# Patient Record
Sex: MALE | Race: WHITE | NOT HISPANIC OR LATINO | Employment: STUDENT | URBAN - METROPOLITAN AREA
[De-identification: names, ages, dates, MRNs, and addresses within clinical notes are randomized per-mention and may not be internally consistent; named-entity substitution may affect disease eponyms.]

---

## 2021-03-26 ENCOUNTER — HOSPITAL ENCOUNTER (EMERGENCY)
Facility: OTHER | Age: 21
Discharge: HOME | End: 2021-03-26
Attending: EMERGENCY MEDICINE
Payer: COMMERCIAL

## 2021-03-26 VITALS
HEIGHT: 66 IN | TEMPERATURE: 98 F | DIASTOLIC BLOOD PRESSURE: 64 MMHG | SYSTOLIC BLOOD PRESSURE: 108 MMHG | OXYGEN SATURATION: 100 % | HEART RATE: 62 BPM | WEIGHT: 175 LBS | RESPIRATION RATE: 18 BRPM | BODY MASS INDEX: 28.12 KG/M2

## 2021-03-26 DIAGNOSIS — R19.7 DIARRHEA, UNSPECIFIED TYPE: Primary | ICD-10-CM

## 2021-03-26 DIAGNOSIS — R10.9 ABDOMINAL PAIN: ICD-10-CM

## 2021-03-26 LAB
ANION GAP SERPL CALC-SCNC: 10 MMOL/L (ref 8–16)
BASOPHILS # BLD AUTO: 0.02 K/UL (ref 0–0.2)
BASOPHILS NFR BLD: 0.4 % (ref 0–1.9)
BUN SERPL-MCNC: 13 MG/DL (ref 6–20)
CALCIUM SERPL-MCNC: 9.8 MG/DL (ref 8.7–10.5)
CHLORIDE SERPL-SCNC: 103 MMOL/L (ref 95–110)
CK SERPL-CCNC: 78 U/L (ref 20–200)
CO2 SERPL-SCNC: 26 MMOL/L (ref 23–29)
CREAT SERPL-MCNC: 1.1 MG/DL (ref 0.5–1.4)
CREAT SERPL-MCNC: 1.1 MG/DL (ref 0.5–1.4)
DIFFERENTIAL METHOD: ABNORMAL
EOSINOPHIL # BLD AUTO: 0.1 K/UL (ref 0–0.5)
EOSINOPHIL NFR BLD: 1.3 % (ref 0–8)
ERYTHROCYTE [DISTWIDTH] IN BLOOD BY AUTOMATED COUNT: 11.9 % (ref 11.5–14.5)
EST. GFR  (AFRICAN AMERICAN): >60 ML/MIN/1.73 M^2
EST. GFR  (NON AFRICAN AMERICAN): >60 ML/MIN/1.73 M^2
GLUCOSE SERPL-MCNC: 84 MG/DL (ref 70–110)
HCT VFR BLD AUTO: 41 % (ref 40–54)
HCV AB SERPL QL IA: NEGATIVE
HGB BLD-MCNC: 14.4 G/DL (ref 14–18)
HIV 1+2 AB+HIV1 P24 AG SERPL QL IA: NEGATIVE
IMM GRANULOCYTES # BLD AUTO: 0.01 K/UL (ref 0–0.04)
IMM GRANULOCYTES NFR BLD AUTO: 0.2 % (ref 0–0.5)
LYMPHOCYTES # BLD AUTO: 1.6 K/UL (ref 1–4.8)
LYMPHOCYTES NFR BLD: 29.5 % (ref 18–48)
MCH RBC QN AUTO: 30.4 PG (ref 27–31)
MCHC RBC AUTO-ENTMCNC: 35.1 G/DL (ref 32–36)
MCV RBC AUTO: 87 FL (ref 82–98)
MONOCYTES # BLD AUTO: 0.5 K/UL (ref 0.3–1)
MONOCYTES NFR BLD: 9.5 % (ref 4–15)
NEUTROPHILS # BLD AUTO: 3.1 K/UL (ref 1.8–7.7)
NEUTROPHILS NFR BLD: 59.1 % (ref 38–73)
NRBC BLD-RTO: 0 /100 WBC
PLATELET # BLD AUTO: 299 K/UL (ref 150–350)
PMV BLD AUTO: 9.1 FL (ref 9.2–12.9)
POTASSIUM SERPL-SCNC: 4.1 MMOL/L (ref 3.5–5.1)
RBC # BLD AUTO: 4.73 M/UL (ref 4.6–6.2)
SAMPLE: NORMAL
SODIUM SERPL-SCNC: 139 MMOL/L (ref 136–145)
WBC # BLD AUTO: 5.29 K/UL (ref 3.9–12.7)

## 2021-03-26 PROCEDURE — 82565 ASSAY OF CREATININE: CPT | Mod: 91

## 2021-03-26 PROCEDURE — 99900035 HC TECH TIME PER 15 MIN (STAT)

## 2021-03-26 PROCEDURE — 80048 BASIC METABOLIC PNL TOTAL CA: CPT | Performed by: EMERGENCY MEDICINE

## 2021-03-26 PROCEDURE — 82550 ASSAY OF CK (CPK): CPT | Performed by: EMERGENCY MEDICINE

## 2021-03-26 PROCEDURE — 96374 THER/PROPH/DIAG INJ IV PUSH: CPT

## 2021-03-26 PROCEDURE — 99285 EMERGENCY DEPT VISIT HI MDM: CPT | Mod: 25

## 2021-03-26 PROCEDURE — 85025 COMPLETE CBC W/AUTO DIFF WBC: CPT | Performed by: EMERGENCY MEDICINE

## 2021-03-26 PROCEDURE — 86803 HEPATITIS C AB TEST: CPT | Performed by: EMERGENCY MEDICINE

## 2021-03-26 PROCEDURE — 25500020 PHARM REV CODE 255: Performed by: EMERGENCY MEDICINE

## 2021-03-26 PROCEDURE — 25000003 PHARM REV CODE 250: Performed by: EMERGENCY MEDICINE

## 2021-03-26 PROCEDURE — 86703 HIV-1/HIV-2 1 RESULT ANTBDY: CPT | Performed by: EMERGENCY MEDICINE

## 2021-03-26 PROCEDURE — 63600175 PHARM REV CODE 636 W HCPCS: Performed by: EMERGENCY MEDICINE

## 2021-03-26 PROCEDURE — 96361 HYDRATE IV INFUSION ADD-ON: CPT

## 2021-03-26 RX ORDER — DICYCLOMINE HYDROCHLORIDE 20 MG/1
20 TABLET ORAL 2 TIMES DAILY
Qty: 20 TABLET | Refills: 0 | Status: SHIPPED | OUTPATIENT
Start: 2021-03-26 | End: 2021-04-25

## 2021-03-26 RX ORDER — ONDANSETRON 4 MG/1
4 TABLET, ORALLY DISINTEGRATING ORAL EVERY 8 HOURS PRN
Qty: 20 TABLET | Refills: 0 | Status: SHIPPED | OUTPATIENT
Start: 2021-03-26 | End: 2021-04-02

## 2021-03-26 RX ORDER — KETOROLAC TROMETHAMINE 30 MG/ML
15 INJECTION, SOLUTION INTRAMUSCULAR; INTRAVENOUS
Status: COMPLETED | OUTPATIENT
Start: 2021-03-26 | End: 2021-03-26

## 2021-03-26 RX ORDER — IBUPROFEN 600 MG/1
600 TABLET ORAL EVERY 6 HOURS PRN
Qty: 20 TABLET | Refills: 0 | Status: SHIPPED | OUTPATIENT
Start: 2021-03-26

## 2021-03-26 RX ADMIN — KETOROLAC TROMETHAMINE 15 MG: 30 INJECTION, SOLUTION INTRAMUSCULAR; INTRAVENOUS at 03:03

## 2021-03-26 RX ADMIN — IOHEXOL 75 ML: 350 INJECTION, SOLUTION INTRAVENOUS at 04:03

## 2021-03-26 RX ADMIN — SODIUM CHLORIDE 1000 ML: 0.9 INJECTION, SOLUTION INTRAVENOUS at 03:03

## 2021-04-30 ENCOUNTER — HOSPITAL ENCOUNTER (INPATIENT)
Facility: OTHER | Age: 21
LOS: 7 days | Discharge: HOME | DRG: 871 | End: 2021-05-07
Attending: EMERGENCY MEDICINE | Admitting: INTERNAL MEDICINE
Payer: COMMERCIAL

## 2021-04-30 DIAGNOSIS — I40.9 ACUTE MYOCARDITIS, UNSPECIFIED MYOCARDITIS TYPE: ICD-10-CM

## 2021-04-30 DIAGNOSIS — J45.21 MILD INTERMITTENT ASTHMA WITH ACUTE EXACERBATION: ICD-10-CM

## 2021-04-30 DIAGNOSIS — I31.9 PERICARDITIS, UNSPECIFIED CHRONICITY, UNSPECIFIED TYPE: ICD-10-CM

## 2021-04-30 DIAGNOSIS — T50.Z95A VACCINATION SIDE EFFECTS, INITIAL ENCOUNTER: Primary | ICD-10-CM

## 2021-04-30 DIAGNOSIS — R00.0 TACHYCARDIA: ICD-10-CM

## 2021-04-30 DIAGNOSIS — R07.9 CHEST PAIN: ICD-10-CM

## 2021-04-30 DIAGNOSIS — I31.9 MYOPERICARDITIS: ICD-10-CM

## 2021-04-30 DIAGNOSIS — I51.4 MYOCARDITIS: ICD-10-CM

## 2021-04-30 DIAGNOSIS — I45.9 DROPPED HEART BEATS: ICD-10-CM

## 2021-04-30 DIAGNOSIS — I50.21 ACUTE SYSTOLIC HEART FAILURE: ICD-10-CM

## 2021-04-30 DIAGNOSIS — I42.9 CARDIOMYOPATHY: ICD-10-CM

## 2021-04-30 DIAGNOSIS — M35.9 UNDIFFERENTIATED CONNECTIVE TISSUE DISEASE: ICD-10-CM

## 2021-04-30 PROBLEM — R65.10 SIRS (SYSTEMIC INFLAMMATORY RESPONSE SYNDROME): Status: ACTIVE | Noted: 2021-04-30

## 2021-04-30 PROBLEM — R79.89 ELEVATED TROPONIN: Status: ACTIVE | Noted: 2021-04-30

## 2021-04-30 PROBLEM — K58.9 IBS (IRRITABLE BOWEL SYNDROME): Status: ACTIVE | Noted: 2021-04-30

## 2021-04-30 LAB
ANION GAP SERPL CALC-SCNC: 10 MMOL/L (ref 8–16)
AV INDEX (PROSTH): 0.7
AV MEAN GRADIENT: 1 MMHG
AV PEAK GRADIENT: 2 MMHG
AV VALVE AREA: 3.04 CM2
AV VELOCITY RATIO: 0.84
BASOPHILS # BLD AUTO: 0.02 K/UL (ref 0–0.2)
BASOPHILS NFR BLD: 0.2 % (ref 0–1.9)
BNP SERPL-MCNC: 36 PG/ML (ref 0–99)
BSA FOR ECHO PROCEDURE: 1.92 M2
BUN SERPL-MCNC: 10 MG/DL (ref 6–20)
CALCIUM SERPL-MCNC: 7.6 MG/DL (ref 8.7–10.5)
CHLORIDE SERPL-SCNC: 105 MMOL/L (ref 95–110)
CO2 SERPL-SCNC: 20 MMOL/L (ref 23–29)
CREAT SERPL-MCNC: 1.1 MG/DL (ref 0.5–1.4)
CRP SERPL-MCNC: 85.6 MG/L (ref 0–8.2)
CTP QC/QA: YES
CV ECHO LV RWT: 0.32 CM
DIFFERENTIAL METHOD: ABNORMAL
DOP CALC AO PEAK VEL: 0.69 M/S
DOP CALC AO VTI: 11.78 CM
DOP CALC LVOT AREA: 4.4 CM2
DOP CALC LVOT DIAMETER: 2.36 CM
DOP CALC LVOT PEAK VEL: 0.58 M/S
DOP CALC LVOT STROKE VOLUME: 35.85 CM3
DOP CALCLVOT PEAK VEL VTI: 8.2 CM
E WAVE DECELERATION TIME: 134.84 MSEC
E/A RATIO: 1.32
E/E' RATIO: 9.54 M/S
ECHO LV POSTERIOR WALL: 0.79 CM (ref 0.6–1.1)
EJECTION FRACTION: 20 %
EOSINOPHIL # BLD AUTO: 0 K/UL (ref 0–0.5)
EOSINOPHIL NFR BLD: 0 % (ref 0–8)
ERYTHROCYTE [DISTWIDTH] IN BLOOD BY AUTOMATED COUNT: 12.1 % (ref 11.5–14.5)
EST. GFR  (AFRICAN AMERICAN): >60 ML/MIN/1.73 M^2
EST. GFR  (NON AFRICAN AMERICAN): >60 ML/MIN/1.73 M^2
FRACTIONAL SHORTENING: 10 % (ref 28–44)
GLUCOSE SERPL-MCNC: 125 MG/DL (ref 70–110)
HCT VFR BLD AUTO: 38.7 % (ref 40–54)
HGB BLD-MCNC: 13.3 G/DL (ref 14–18)
IMM GRANULOCYTES # BLD AUTO: 0.05 K/UL (ref 0–0.04)
IMM GRANULOCYTES NFR BLD AUTO: 0.4 % (ref 0–0.5)
INTERVENTRICULAR SEPTUM: 0.8 CM (ref 0.6–1.1)
IVRT: 59.98 MSEC
LA MAJOR: 4.03 CM
LA MINOR: 5.23 CM
LA WIDTH: 3.19 CM
LEFT ATRIUM SIZE: 2.99 CM
LEFT ATRIUM VOLUME INDEX MOD: 21.7 ML/M2
LEFT ATRIUM VOLUME INDEX: 19.5 ML/M2
LEFT ATRIUM VOLUME MOD: 41 CM3
LEFT ATRIUM VOLUME: 36.91 CM3
LEFT INTERNAL DIMENSION IN SYSTOLE: 4.45 CM (ref 2.1–4)
LEFT VENTRICLE DIASTOLIC VOLUME INDEX: 61.09 ML/M2
LEFT VENTRICLE DIASTOLIC VOLUME: 115.46 ML
LEFT VENTRICLE MASS INDEX: 70 G/M2
LEFT VENTRICLE SYSTOLIC VOLUME INDEX: 47.7 ML/M2
LEFT VENTRICLE SYSTOLIC VOLUME: 90.15 ML
LEFT VENTRICULAR INTERNAL DIMENSION IN DIASTOLE: 4.95 CM (ref 3.5–6)
LEFT VENTRICULAR MASS: 132.42 G
LV LATERAL E/E' RATIO: 6.89 M/S
LV SEPTAL E/E' RATIO: 15.5 M/S
LYMPHOCYTES # BLD AUTO: 1.4 K/UL (ref 1–4.8)
LYMPHOCYTES NFR BLD: 12.5 % (ref 18–48)
MCH RBC QN AUTO: 29.8 PG (ref 27–31)
MCHC RBC AUTO-ENTMCNC: 34.4 G/DL (ref 32–36)
MCV RBC AUTO: 87 FL (ref 82–98)
MONOCYTES # BLD AUTO: 0.6 K/UL (ref 0.3–1)
MONOCYTES NFR BLD: 4.8 % (ref 4–15)
MV PEAK A VEL: 0.47 M/S
MV PEAK E VEL: 0.62 M/S
MV STENOSIS PRESSURE HALF TIME: 39.1 MS
MV VALVE AREA P 1/2 METHOD: 5.63 CM2
NEUTROPHILS # BLD AUTO: 9.4 K/UL (ref 1.8–7.7)
NEUTROPHILS NFR BLD: 82.1 % (ref 38–73)
NRBC BLD-RTO: 0 /100 WBC
PISA TR MAX VEL: 2.07 M/S
PLATELET # BLD AUTO: 243 K/UL (ref 150–450)
PMV BLD AUTO: 9.3 FL (ref 9.2–12.9)
POTASSIUM SERPL-SCNC: 4.3 MMOL/L (ref 3.5–5.1)
PROCALCITONIN SERPL IA-MCNC: 0.92 NG/ML
PULM VEIN S/D RATIO: 1.11
PV PEAK D VEL: 0.18 M/S
PV PEAK S VEL: 0.2 M/S
PV PEAK VELOCITY: 0.49 CM/S
RA MAJOR: 3.04 CM
RA PRESSURE: 3 MMHG
RBC # BLD AUTO: 4.46 M/UL (ref 4.6–6.2)
SARS-COV-2 RDRP RESP QL NAA+PROBE: NEGATIVE
SINUS: 2.28 CM
SODIUM SERPL-SCNC: 135 MMOL/L (ref 136–145)
TDI LATERAL: 0.09 M/S
TDI SEPTAL: 0.04 M/S
TDI: 0.07 M/S
TR MAX PG: 17 MMHG
TROPONIN I SERPL DL<=0.01 NG/ML-MCNC: 0.39 NG/ML (ref 0–0.03)
TROPONIN I SERPL DL<=0.01 NG/ML-MCNC: 0.72 NG/ML (ref 0–0.03)
TROPONIN I SERPL DL<=0.01 NG/ML-MCNC: 0.85 NG/ML (ref 0–0.03)
TSH SERPL DL<=0.005 MIU/L-ACNC: 0.56 UIU/ML (ref 0.4–4)
TV REST PULMONARY ARTERY PRESSURE: 20 MMHG
WBC # BLD AUTO: 11.47 K/UL (ref 3.9–12.7)

## 2021-04-30 PROCEDURE — 36415 COLL VENOUS BLD VENIPUNCTURE: CPT | Performed by: INTERNAL MEDICINE

## 2021-04-30 PROCEDURE — 93005 ELECTROCARDIOGRAM TRACING: CPT

## 2021-04-30 PROCEDURE — 84484 ASSAY OF TROPONIN QUANT: CPT | Mod: 91 | Performed by: INTERNAL MEDICINE

## 2021-04-30 PROCEDURE — 93010 EKG 12-LEAD: ICD-10-PCS | Mod: 77,,, | Performed by: INTERNAL MEDICINE

## 2021-04-30 PROCEDURE — 96374 THER/PROPH/DIAG INJ IV PUSH: CPT

## 2021-04-30 PROCEDURE — 25000003 PHARM REV CODE 250: Performed by: EMERGENCY MEDICINE

## 2021-04-30 PROCEDURE — 99291 CRITICAL CARE FIRST HOUR: CPT | Mod: 25

## 2021-04-30 PROCEDURE — U0002 COVID-19 LAB TEST NON-CDC: HCPCS | Performed by: EMERGENCY MEDICINE

## 2021-04-30 PROCEDURE — 63600175 PHARM REV CODE 636 W HCPCS: Performed by: INTERNAL MEDICINE

## 2021-04-30 PROCEDURE — 93010 ELECTROCARDIOGRAM REPORT: CPT | Mod: 77,,, | Performed by: INTERNAL MEDICINE

## 2021-04-30 PROCEDURE — 85651 RBC SED RATE NONAUTOMATED: CPT | Performed by: INTERNAL MEDICINE

## 2021-04-30 PROCEDURE — 84443 ASSAY THYROID STIM HORMONE: CPT | Performed by: INTERNAL MEDICINE

## 2021-04-30 PROCEDURE — 25000242 PHARM REV CODE 250 ALT 637 W/ HCPCS: Performed by: EMERGENCY MEDICINE

## 2021-04-30 PROCEDURE — 94640 AIRWAY INHALATION TREATMENT: CPT

## 2021-04-30 PROCEDURE — 99223 1ST HOSP IP/OBS HIGH 75: CPT | Mod: ,,, | Performed by: INTERNAL MEDICINE

## 2021-04-30 PROCEDURE — 63600175 PHARM REV CODE 636 W HCPCS: Performed by: EMERGENCY MEDICINE

## 2021-04-30 PROCEDURE — 93010 ELECTROCARDIOGRAM REPORT: CPT | Mod: ,,, | Performed by: INTERNAL MEDICINE

## 2021-04-30 PROCEDURE — 96361 HYDRATE IV INFUSION ADD-ON: CPT

## 2021-04-30 PROCEDURE — 84484 ASSAY OF TROPONIN QUANT: CPT | Mod: 91 | Performed by: EMERGENCY MEDICINE

## 2021-04-30 PROCEDURE — 21400001 HC TELEMETRY ROOM

## 2021-04-30 PROCEDURE — 96375 TX/PRO/DX INJ NEW DRUG ADDON: CPT

## 2021-04-30 PROCEDURE — 84145 PROCALCITONIN (PCT): CPT | Performed by: INTERNAL MEDICINE

## 2021-04-30 PROCEDURE — 99223 PR INITIAL HOSPITAL CARE,LEVL III: ICD-10-PCS | Mod: ,,, | Performed by: INTERNAL MEDICINE

## 2021-04-30 PROCEDURE — 99255 PR INITIAL INPATIENT CONSULT,LEVL V: ICD-10-PCS | Mod: 25,,, | Performed by: INTERNAL MEDICINE

## 2021-04-30 PROCEDURE — 85025 COMPLETE CBC W/AUTO DIFF WBC: CPT | Performed by: EMERGENCY MEDICINE

## 2021-04-30 PROCEDURE — 80048 BASIC METABOLIC PNL TOTAL CA: CPT | Performed by: EMERGENCY MEDICINE

## 2021-04-30 PROCEDURE — 99255 IP/OBS CONSLTJ NEW/EST HI 80: CPT | Mod: 25,,, | Performed by: INTERNAL MEDICINE

## 2021-04-30 PROCEDURE — 25000003 PHARM REV CODE 250: Performed by: INTERNAL MEDICINE

## 2021-04-30 PROCEDURE — 93010 EKG 12-LEAD: ICD-10-PCS | Mod: ,,, | Performed by: INTERNAL MEDICINE

## 2021-04-30 PROCEDURE — 86140 C-REACTIVE PROTEIN: CPT | Performed by: INTERNAL MEDICINE

## 2021-04-30 PROCEDURE — 84484 ASSAY OF TROPONIN QUANT: CPT | Performed by: INTERNAL MEDICINE

## 2021-04-30 PROCEDURE — 83880 ASSAY OF NATRIURETIC PEPTIDE: CPT | Performed by: INTERNAL MEDICINE

## 2021-04-30 RX ORDER — KETOROLAC TROMETHAMINE 30 MG/ML
15 INJECTION, SOLUTION INTRAMUSCULAR; INTRAVENOUS
Status: COMPLETED | OUTPATIENT
Start: 2021-04-30 | End: 2021-04-30

## 2021-04-30 RX ORDER — SERTRALINE HYDROCHLORIDE 50 MG/1
75 TABLET, FILM COATED ORAL DAILY
COMMUNITY
End: 2022-05-10 | Stop reason: SDUPTHER

## 2021-04-30 RX ORDER — PANTOPRAZOLE SODIUM 40 MG/1
40 TABLET, DELAYED RELEASE ORAL DAILY
Status: DISCONTINUED | OUTPATIENT
Start: 2021-04-30 | End: 2021-05-07 | Stop reason: HOSPADM

## 2021-04-30 RX ORDER — ALBUTEROL SULFATE 2.5 MG/.5ML
2.5 SOLUTION RESPIRATORY (INHALATION)
Status: COMPLETED | OUTPATIENT
Start: 2021-04-30 | End: 2021-04-30

## 2021-04-30 RX ORDER — ACETAMINOPHEN 500 MG
1000 TABLET ORAL
Status: COMPLETED | OUTPATIENT
Start: 2021-04-30 | End: 2021-04-30

## 2021-04-30 RX ORDER — IBUPROFEN 400 MG/1
800 TABLET ORAL
Status: COMPLETED | OUTPATIENT
Start: 2021-04-30 | End: 2021-04-30

## 2021-04-30 RX ORDER — IBUPROFEN 600 MG/1
600 TABLET ORAL 3 TIMES DAILY
Status: DISCONTINUED | OUTPATIENT
Start: 2021-04-30 | End: 2021-05-01

## 2021-04-30 RX ORDER — COLCHICINE 0.6 MG/1
0.6 TABLET, FILM COATED ORAL 2 TIMES DAILY
Status: DISCONTINUED | OUTPATIENT
Start: 2021-04-30 | End: 2021-05-07 | Stop reason: HOSPADM

## 2021-04-30 RX ORDER — ONDANSETRON 2 MG/ML
4 INJECTION INTRAMUSCULAR; INTRAVENOUS
Status: DISCONTINUED | OUTPATIENT
Start: 2021-04-30 | End: 2021-04-30

## 2021-04-30 RX ORDER — SODIUM CHLORIDE 0.9 % (FLUSH) 0.9 %
10 SYRINGE (ML) INJECTION
Status: DISCONTINUED | OUTPATIENT
Start: 2021-04-30 | End: 2021-05-07 | Stop reason: HOSPADM

## 2021-04-30 RX ORDER — ONDANSETRON 2 MG/ML
4 INJECTION INTRAMUSCULAR; INTRAVENOUS EVERY 6 HOURS PRN
Status: DISCONTINUED | OUTPATIENT
Start: 2021-04-30 | End: 2021-05-07 | Stop reason: HOSPADM

## 2021-04-30 RX ORDER — PANTOPRAZOLE SODIUM 40 MG/1
40 TABLET, DELAYED RELEASE ORAL DAILY
COMMUNITY

## 2021-04-30 RX ORDER — MORPHINE SULFATE 4 MG/ML
4 INJECTION, SOLUTION INTRAMUSCULAR; INTRAVENOUS
Status: COMPLETED | OUTPATIENT
Start: 2021-04-30 | End: 2021-04-30

## 2021-04-30 RX ORDER — IBUPROFEN 200 MG
24 TABLET ORAL
Status: DISCONTINUED | OUTPATIENT
Start: 2021-04-30 | End: 2021-05-07 | Stop reason: HOSPADM

## 2021-04-30 RX ORDER — AMITRIPTYLINE HYDROCHLORIDE 50 MG/1
50 TABLET, FILM COATED ORAL NIGHTLY
COMMUNITY
End: 2022-01-14

## 2021-04-30 RX ORDER — AMITRIPTYLINE HYDROCHLORIDE 25 MG/1
50 TABLET, FILM COATED ORAL NIGHTLY
Status: DISCONTINUED | OUTPATIENT
Start: 2021-04-30 | End: 2021-05-01

## 2021-04-30 RX ORDER — ALBUTEROL SULFATE 90 UG/1
1-2 AEROSOL, METERED RESPIRATORY (INHALATION) EVERY 6 HOURS PRN
Qty: 8 G | Refills: 0 | Status: SHIPPED | OUTPATIENT
Start: 2021-04-30 | End: 2021-05-04 | Stop reason: SDUPTHER

## 2021-04-30 RX ORDER — SODIUM CHLORIDE 9 MG/ML
INJECTION, SOLUTION INTRAVENOUS CONTINUOUS
Status: DISCONTINUED | OUTPATIENT
Start: 2021-04-30 | End: 2021-05-01

## 2021-04-30 RX ORDER — ACETAMINOPHEN 325 MG/1
650 TABLET ORAL EVERY 4 HOURS PRN
Status: DISCONTINUED | OUTPATIENT
Start: 2021-04-30 | End: 2021-05-01

## 2021-04-30 RX ORDER — GLUCAGON 1 MG
1 KIT INJECTION
Status: DISCONTINUED | OUTPATIENT
Start: 2021-04-30 | End: 2021-05-07 | Stop reason: HOSPADM

## 2021-04-30 RX ORDER — ERENUMAB-AOOE 140 MG/ML
140 INJECTION, SOLUTION SUBCUTANEOUS
COMMUNITY
Start: 2021-04-26 | End: 2022-01-14

## 2021-04-30 RX ORDER — MORPHINE SULFATE 2 MG/ML
3 INJECTION, SOLUTION INTRAMUSCULAR; INTRAVENOUS EVERY 4 HOURS PRN
Status: DISCONTINUED | OUTPATIENT
Start: 2021-04-30 | End: 2021-05-01

## 2021-04-30 RX ORDER — IBUPROFEN 200 MG
16 TABLET ORAL
Status: DISCONTINUED | OUTPATIENT
Start: 2021-04-30 | End: 2021-05-07 | Stop reason: HOSPADM

## 2021-04-30 RX ORDER — SODIUM CHLORIDE 9 MG/ML
INJECTION, SOLUTION INTRAVENOUS
Status: COMPLETED | OUTPATIENT
Start: 2021-04-30 | End: 2021-04-30

## 2021-04-30 RX ADMIN — SODIUM CHLORIDE: 0.9 INJECTION, SOLUTION INTRAVENOUS at 03:04

## 2021-04-30 RX ADMIN — IBUPROFEN 600 MG: 600 TABLET ORAL at 09:04

## 2021-04-30 RX ADMIN — PROMETHAZINE HYDROCHLORIDE 12.5 MG: 25 INJECTION INTRAMUSCULAR; INTRAVENOUS at 06:04

## 2021-04-30 RX ADMIN — AMITRIPTYLINE HYDROCHLORIDE 50 MG: 25 TABLET, FILM COATED ORAL at 08:04

## 2021-04-30 RX ADMIN — MORPHINE SULFATE 4 MG: 4 INJECTION, SOLUTION INTRAMUSCULAR; INTRAVENOUS at 08:04

## 2021-04-30 RX ADMIN — IBUPROFEN 600 MG: 600 TABLET ORAL at 03:04

## 2021-04-30 RX ADMIN — SODIUM CHLORIDE: 0.9 INJECTION, SOLUTION INTRAVENOUS at 04:04

## 2021-04-30 RX ADMIN — ALBUTEROL SULFATE 2.5 MG: 2.5 SOLUTION RESPIRATORY (INHALATION) at 05:04

## 2021-04-30 RX ADMIN — IBUPROFEN 800 MG: 400 TABLET, FILM COATED ORAL at 03:04

## 2021-04-30 RX ADMIN — KETOROLAC TROMETHAMINE 15 MG: 30 INJECTION, SOLUTION INTRAMUSCULAR; INTRAVENOUS at 07:04

## 2021-04-30 RX ADMIN — ONDANSETRON 4 MG: 2 INJECTION INTRAMUSCULAR; INTRAVENOUS at 08:04

## 2021-04-30 RX ADMIN — COLCHICINE 0.6 MG: 0.6 TABLET, FILM COATED ORAL at 08:04

## 2021-04-30 RX ADMIN — MORPHINE SULFATE 3 MG: 2 INJECTION, SOLUTION INTRAMUSCULAR; INTRAVENOUS at 03:04

## 2021-04-30 RX ADMIN — ALBUTEROL SULFATE 2.5 MG: 2.5 SOLUTION RESPIRATORY (INHALATION) at 06:04

## 2021-04-30 RX ADMIN — ACETAMINOPHEN 1000 MG: 500 TABLET, FILM COATED ORAL at 05:04

## 2021-04-30 RX ADMIN — SODIUM CHLORIDE 1000 ML: 0.9 INJECTION, SOLUTION INTRAVENOUS at 07:04

## 2021-04-30 RX ADMIN — ONDANSETRON 4 MG: 2 INJECTION INTRAMUSCULAR; INTRAVENOUS at 03:04

## 2021-04-30 RX ADMIN — PANTOPRAZOLE SODIUM 40 MG: 20 TABLET, DELAYED RELEASE ORAL at 03:04

## 2021-04-30 RX ADMIN — PROMETHAZINE HYDROCHLORIDE 12.5 MG: 25 INJECTION INTRAMUSCULAR; INTRAVENOUS at 03:04

## 2021-04-30 RX ADMIN — PROMETHAZINE HYDROCHLORIDE 12.5 MG: 25 INJECTION INTRAMUSCULAR; INTRAVENOUS at 09:04

## 2021-05-01 PROBLEM — G43.009 MIGRAINE WITHOUT AURA AND WITHOUT STATUS MIGRAINOSUS, NOT INTRACTABLE: Status: ACTIVE | Noted: 2018-01-03

## 2021-05-01 PROBLEM — I31.9 MYOPERICARDITIS: Status: ACTIVE | Noted: 2021-05-01

## 2021-05-01 PROBLEM — I50.21 ACUTE SYSTOLIC HEART FAILURE: Status: ACTIVE | Noted: 2021-05-01

## 2021-05-01 PROBLEM — A41.9 SEPSIS: Status: ACTIVE | Noted: 2021-05-01

## 2021-05-01 PROBLEM — A41.9 SEPSIS: Status: ACTIVE | Noted: 2021-04-30

## 2021-05-01 PROBLEM — I40.8 OTHER ACUTE MYOCARDITIS: Status: ACTIVE | Noted: 2021-05-01

## 2021-05-01 PROBLEM — T50.Z95A: Status: ACTIVE | Noted: 2021-05-01

## 2021-05-01 PROBLEM — K58.9 IBS (IRRITABLE BOWEL SYNDROME): Status: ACTIVE | Noted: 2018-01-03

## 2021-05-01 LAB
ALBUMIN SERPL BCP-MCNC: 3.4 G/DL (ref 3.5–5.2)
ALLENS TEST: ABNORMAL
ANION GAP SERPL CALC-SCNC: 10 MMOL/L (ref 8–16)
BNP SERPL-MCNC: 141 PG/ML (ref 0–99)
BUN SERPL-MCNC: 11 MG/DL (ref 6–20)
C3 SERPL-MCNC: 112 MG/DL (ref 50–180)
C4 SERPL-MCNC: 18 MG/DL (ref 11–44)
CALCIUM SERPL-MCNC: 9.1 MG/DL (ref 8.7–10.5)
CHLORIDE SERPL-SCNC: 100 MMOL/L (ref 95–110)
CO2 SERPL-SCNC: 21 MMOL/L (ref 23–29)
CREAT SERPL-MCNC: 1 MG/DL (ref 0.5–1.4)
CREAT UR-MCNC: 11 MG/DL (ref 23–375)
DELSYS: ABNORMAL
ERYTHROCYTE [DISTWIDTH] IN BLOOD BY AUTOMATED COUNT: 12.7 % (ref 11.5–14.5)
ERYTHROCYTE [SEDIMENTATION RATE] IN BLOOD: 20 MM/HR (ref 0–10)
EST. GFR  (AFRICAN AMERICAN): >60 ML/MIN/1.73 M^2
EST. GFR  (NON AFRICAN AMERICAN): >60 ML/MIN/1.73 M^2
FIO2: 100
FLOW: 15
GLUCOSE SERPL-MCNC: 103 MG/DL (ref 70–110)
HCO3 UR-SCNC: 16.7 MMOL/L (ref 24–28)
HCT VFR BLD AUTO: 38.7 % (ref 40–54)
HGB BLD-MCNC: 13.4 G/DL (ref 14–18)
LACTATE SERPL-SCNC: 2 MMOL/L (ref 0.5–2.2)
MAGNESIUM SERPL-MCNC: 1.5 MG/DL (ref 1.6–2.6)
MCH RBC QN AUTO: 30.5 PG (ref 27–31)
MCHC RBC AUTO-ENTMCNC: 34.6 G/DL (ref 32–36)
MCV RBC AUTO: 88 FL (ref 82–98)
MODE: ABNORMAL
PCO2 BLDA: 26.1 MMHG (ref 35–45)
PH SMN: 7.41 [PH] (ref 7.35–7.45)
PHOSPHATE SERPL-MCNC: 1.7 MG/DL (ref 2.7–4.5)
PLATELET # BLD AUTO: 256 K/UL (ref 150–450)
PMV BLD AUTO: 9.5 FL (ref 9.2–12.9)
PO2 BLDA: 57 MMHG (ref 80–100)
POC BE: -8 MMOL/L
POC SATURATED O2: 90 % (ref 95–100)
POC TCO2: 17 MMOL/L (ref 23–27)
POTASSIUM SERPL-SCNC: 4.1 MMOL/L (ref 3.5–5.1)
PROT UR-MCNC: <7 MG/DL (ref 0–15)
PROT/CREAT UR: ABNORMAL MG/G{CREAT} (ref 0–0.2)
RBC # BLD AUTO: 4.4 M/UL (ref 4.6–6.2)
SAMPLE: ABNORMAL
SITE: ABNORMAL
SODIUM SERPL-SCNC: 131 MMOL/L (ref 136–145)
SP02: 91
TROPONIN I SERPL DL<=0.01 NG/ML-MCNC: 0.6 NG/ML (ref 0–0.03)
TROPONIN I SERPL DL<=0.01 NG/ML-MCNC: 2.33 NG/ML (ref 0–0.03)
TROPONIN I SERPL DL<=0.01 NG/ML-MCNC: 9.22 NG/ML (ref 0–0.03)
WBC # BLD AUTO: 13.75 K/UL (ref 3.9–12.7)

## 2021-05-01 PROCEDURE — 86038 ANTINUCLEAR ANTIBODIES: CPT | Performed by: STUDENT IN AN ORGANIZED HEALTH CARE EDUCATION/TRAINING PROGRAM

## 2021-05-01 PROCEDURE — 86039 ANTINUCLEAR ANTIBODIES (ANA): CPT | Performed by: STUDENT IN AN ORGANIZED HEALTH CARE EDUCATION/TRAINING PROGRAM

## 2021-05-01 PROCEDURE — 93005 ELECTROCARDIOGRAM TRACING: CPT

## 2021-05-01 PROCEDURE — 99292 CRITICAL CARE ADDL 30 MIN: CPT | Mod: ,,, | Performed by: INTERNAL MEDICINE

## 2021-05-01 PROCEDURE — 86160 COMPLEMENT ANTIGEN: CPT | Mod: 59 | Performed by: STUDENT IN AN ORGANIZED HEALTH CARE EDUCATION/TRAINING PROGRAM

## 2021-05-01 PROCEDURE — 99253 PR INITIAL INPATIENT CONSULT,LEVL III: ICD-10-PCS | Mod: ,,, | Performed by: INTERNAL MEDICINE

## 2021-05-01 PROCEDURE — 20000000 HC ICU ROOM

## 2021-05-01 PROCEDURE — 25000003 PHARM REV CODE 250: Performed by: INTERNAL MEDICINE

## 2021-05-01 PROCEDURE — 63600175 PHARM REV CODE 636 W HCPCS

## 2021-05-01 PROCEDURE — 25000003 PHARM REV CODE 250: Performed by: STUDENT IN AN ORGANIZED HEALTH CARE EDUCATION/TRAINING PROGRAM

## 2021-05-01 PROCEDURE — 99900035 HC TECH TIME PER 15 MIN (STAT)

## 2021-05-01 PROCEDURE — 99252 IP/OBS CONSLTJ NEW/EST SF 35: CPT | Mod: ,,, | Performed by: INTERNAL MEDICINE

## 2021-05-01 PROCEDURE — 36415 COLL VENOUS BLD VENIPUNCTURE: CPT | Performed by: INTERNAL MEDICINE

## 2021-05-01 PROCEDURE — 83605 ASSAY OF LACTIC ACID: CPT | Performed by: FAMILY MEDICINE

## 2021-05-01 PROCEDURE — 25000003 PHARM REV CODE 250: Performed by: FAMILY MEDICINE

## 2021-05-01 PROCEDURE — 93010 ELECTROCARDIOGRAM REPORT: CPT | Mod: ,,, | Performed by: INTERNAL MEDICINE

## 2021-05-01 PROCEDURE — 86160 COMPLEMENT ANTIGEN: CPT | Performed by: STUDENT IN AN ORGANIZED HEALTH CARE EDUCATION/TRAINING PROGRAM

## 2021-05-01 PROCEDURE — 63600175 PHARM REV CODE 636 W HCPCS: Performed by: INTERNAL MEDICINE

## 2021-05-01 PROCEDURE — 94660 CPAP INITIATION&MGMT: CPT

## 2021-05-01 PROCEDURE — 94761 N-INVAS EAR/PLS OXIMETRY MLT: CPT

## 2021-05-01 PROCEDURE — 84484 ASSAY OF TROPONIN QUANT: CPT | Performed by: INTERNAL MEDICINE

## 2021-05-01 PROCEDURE — 99252 PR INITIAL INPATIENT CONSULT,LEVL II: ICD-10-PCS | Mod: ,,, | Performed by: INTERNAL MEDICINE

## 2021-05-01 PROCEDURE — 83735 ASSAY OF MAGNESIUM: CPT | Performed by: INTERNAL MEDICINE

## 2021-05-01 PROCEDURE — 86703 HIV-1/HIV-2 1 RESULT ANTBDY: CPT

## 2021-05-01 PROCEDURE — 27100171 HC OXYGEN HIGH FLOW UP TO 24 HOURS

## 2021-05-01 PROCEDURE — 63600175 PHARM REV CODE 636 W HCPCS: Performed by: STUDENT IN AN ORGANIZED HEALTH CARE EDUCATION/TRAINING PROGRAM

## 2021-05-01 PROCEDURE — 99291 PR CRITICAL CARE, E/M 30-74 MINUTES: ICD-10-PCS | Mod: ,,, | Performed by: INTERNAL MEDICINE

## 2021-05-01 PROCEDURE — 36600 WITHDRAWAL OF ARTERIAL BLOOD: CPT

## 2021-05-01 PROCEDURE — 27000221 HC OXYGEN, UP TO 24 HOURS

## 2021-05-01 PROCEDURE — 25000003 PHARM REV CODE 250: Performed by: HOSPITALIST

## 2021-05-01 PROCEDURE — 82570 ASSAY OF URINE CREATININE: CPT | Performed by: STUDENT IN AN ORGANIZED HEALTH CARE EDUCATION/TRAINING PROGRAM

## 2021-05-01 PROCEDURE — 93010 EKG 12-LEAD: ICD-10-PCS | Mod: ,,, | Performed by: INTERNAL MEDICINE

## 2021-05-01 PROCEDURE — 83880 ASSAY OF NATRIURETIC PEPTIDE: CPT

## 2021-05-01 PROCEDURE — 84484 ASSAY OF TROPONIN QUANT: CPT | Mod: 91 | Performed by: INTERNAL MEDICINE

## 2021-05-01 PROCEDURE — 27000190 HC CPAP FULL FACE MASK W/VALVE

## 2021-05-01 PROCEDURE — 80069 RENAL FUNCTION PANEL: CPT | Performed by: INTERNAL MEDICINE

## 2021-05-01 PROCEDURE — 86235 NUCLEAR ANTIGEN ANTIBODY: CPT | Mod: 59 | Performed by: STUDENT IN AN ORGANIZED HEALTH CARE EDUCATION/TRAINING PROGRAM

## 2021-05-01 PROCEDURE — 99291 CRITICAL CARE FIRST HOUR: CPT | Mod: ,,, | Performed by: INTERNAL MEDICINE

## 2021-05-01 PROCEDURE — 82803 BLOOD GASES ANY COMBINATION: CPT

## 2021-05-01 PROCEDURE — 85027 COMPLETE CBC AUTOMATED: CPT | Performed by: INTERNAL MEDICINE

## 2021-05-01 PROCEDURE — 99253 IP/OBS CNSLTJ NEW/EST LOW 45: CPT | Mod: ,,, | Performed by: INTERNAL MEDICINE

## 2021-05-01 PROCEDURE — 99292 PR CRITICAL CARE, ADDL 30 MIN: ICD-10-PCS | Mod: ,,, | Performed by: INTERNAL MEDICINE

## 2021-05-01 PROCEDURE — 87040 BLOOD CULTURE FOR BACTERIA: CPT | Mod: 59 | Performed by: STUDENT IN AN ORGANIZED HEALTH CARE EDUCATION/TRAINING PROGRAM

## 2021-05-01 RX ORDER — GUAIFENESIN 100 MG/5ML
200 SOLUTION ORAL EVERY 6 HOURS PRN
Status: DISCONTINUED | OUTPATIENT
Start: 2021-05-01 | End: 2021-05-01

## 2021-05-01 RX ORDER — SODIUM CHLORIDE 9 MG/ML
INJECTION, SOLUTION INTRAVENOUS CONTINUOUS
Status: DISCONTINUED | OUTPATIENT
Start: 2021-05-01 | End: 2021-05-07 | Stop reason: HOSPADM

## 2021-05-01 RX ORDER — TALC
6 POWDER (GRAM) TOPICAL NIGHTLY PRN
Status: DISCONTINUED | OUTPATIENT
Start: 2021-05-01 | End: 2021-05-07 | Stop reason: HOSPADM

## 2021-05-01 RX ORDER — SODIUM CHLORIDE 9 MG/ML
INJECTION, SOLUTION INTRAVENOUS
Status: DISCONTINUED | OUTPATIENT
Start: 2021-05-01 | End: 2021-05-07 | Stop reason: HOSPADM

## 2021-05-01 RX ORDER — PANTOPRAZOLE SODIUM 40 MG/1
40 FOR SUSPENSION ORAL DAILY
Status: DISCONTINUED | OUTPATIENT
Start: 2021-05-01 | End: 2021-05-01

## 2021-05-01 RX ORDER — ASPIRIN 325 MG
975 TABLET ORAL EVERY 8 HOURS
Status: DISCONTINUED | OUTPATIENT
Start: 2021-05-01 | End: 2021-05-01

## 2021-05-01 RX ORDER — PROMETHAZINE HYDROCHLORIDE AND CODEINE PHOSPHATE 6.25; 1 MG/5ML; MG/5ML
7.5 SOLUTION ORAL EVERY 4 HOURS PRN
Status: DISCONTINUED | OUTPATIENT
Start: 2021-05-01 | End: 2021-05-07 | Stop reason: HOSPADM

## 2021-05-01 RX ORDER — MAGNESIUM SULFATE HEPTAHYDRATE 40 MG/ML
2 INJECTION, SOLUTION INTRAVENOUS ONCE
Status: COMPLETED | OUTPATIENT
Start: 2021-05-01 | End: 2021-05-01

## 2021-05-01 RX ORDER — ACETAMINOPHEN 325 MG/1
650 TABLET ORAL EVERY 6 HOURS PRN
Status: DISCONTINUED | OUTPATIENT
Start: 2021-05-01 | End: 2021-05-07 | Stop reason: HOSPADM

## 2021-05-01 RX ORDER — METOPROLOL TARTRATE 25 MG/1
25 TABLET, FILM COATED ORAL DAILY
Status: DISCONTINUED | OUTPATIENT
Start: 2021-05-01 | End: 2021-05-01

## 2021-05-01 RX ORDER — VANCOMYCIN HCL IN 5 % DEXTROSE 1G/250ML
1000 PLASTIC BAG, INJECTION (ML) INTRAVENOUS
Status: DISCONTINUED | OUTPATIENT
Start: 2021-05-01 | End: 2021-05-02

## 2021-05-01 RX ORDER — HEPARIN SODIUM 5000 [USP'U]/ML
5000 INJECTION, SOLUTION INTRAVENOUS; SUBCUTANEOUS EVERY 8 HOURS
Status: DISCONTINUED | OUTPATIENT
Start: 2021-05-01 | End: 2021-05-04

## 2021-05-01 RX ORDER — ASPIRIN 325 MG
975 TABLET ORAL 3 TIMES DAILY
Status: DISCONTINUED | OUTPATIENT
Start: 2021-05-02 | End: 2021-05-02

## 2021-05-01 RX ORDER — METOPROLOL SUCCINATE 25 MG/1
25 TABLET, EXTENDED RELEASE ORAL DAILY
Status: DISCONTINUED | OUTPATIENT
Start: 2021-05-01 | End: 2021-05-02

## 2021-05-01 RX ADMIN — COLCHICINE 0.6 MG: 0.6 TABLET, FILM COATED ORAL at 09:05

## 2021-05-01 RX ADMIN — SODIUM CHLORIDE, SODIUM LACTATE, POTASSIUM CHLORIDE, AND CALCIUM CHLORIDE 500 ML: .6; .31; .03; .02 INJECTION, SOLUTION INTRAVENOUS at 10:05

## 2021-05-01 RX ADMIN — AZITHROMYCIN MONOHYDRATE 500 MG: 500 INJECTION, POWDER, LYOPHILIZED, FOR SOLUTION INTRAVENOUS at 11:05

## 2021-05-01 RX ADMIN — PROMETHAZINE HYDROCHLORIDE AND CODEINE PHOSPHATE 7.5 ML: 10; 6.25 SOLUTION ORAL at 09:05

## 2021-05-01 RX ADMIN — METHYLPREDNISOLONE SODIUM SUCCINATE 125 MG: 40 INJECTION, POWDER, FOR SOLUTION INTRAMUSCULAR; INTRAVENOUS at 11:05

## 2021-05-01 RX ADMIN — VANCOMYCIN HYDROCHLORIDE 1750 MG: 5 INJECTION, POWDER, LYOPHILIZED, FOR SOLUTION INTRAVENOUS at 11:05

## 2021-05-01 RX ADMIN — PROMETHAZINE HYDROCHLORIDE 12.5 MG: 25 INJECTION INTRAMUSCULAR; INTRAVENOUS at 06:05

## 2021-05-01 RX ADMIN — ACETAMINOPHEN 650 MG: 325 TABLET ORAL at 09:05

## 2021-05-01 RX ADMIN — MORPHINE SULFATE 3 MG: 2 INJECTION, SOLUTION INTRAMUSCULAR; INTRAVENOUS at 07:05

## 2021-05-01 RX ADMIN — VALSARTAN 20 MG: 40 TABLET, FILM COATED ORAL at 03:05

## 2021-05-01 RX ADMIN — SODIUM CHLORIDE: 0.9 INJECTION, SOLUTION INTRAVENOUS at 06:05

## 2021-05-01 RX ADMIN — METHYLPREDNISOLONE SODIUM SUCCINATE 125 MG: 40 INJECTION, POWDER, FOR SOLUTION INTRAMUSCULAR; INTRAVENOUS at 05:05

## 2021-05-01 RX ADMIN — SODIUM PHOSPHATE, MONOBASIC, MONOHYDRATE 30 MMOL: 276; 142 INJECTION, SOLUTION INTRAVENOUS at 12:05

## 2021-05-01 RX ADMIN — HEPARIN SODIUM 5000 UNITS: 5000 INJECTION INTRAVENOUS; SUBCUTANEOUS at 09:05

## 2021-05-01 RX ADMIN — VANCOMYCIN HYDROCHLORIDE 1000 MG: 1 INJECTION, POWDER, LYOPHILIZED, FOR SOLUTION INTRAVENOUS at 08:05

## 2021-05-01 RX ADMIN — METOPROLOL SUCCINATE 25 MG: 25 TABLET, EXTENDED RELEASE ORAL at 03:05

## 2021-05-01 RX ADMIN — SODIUM CHLORIDE: 0.9 INJECTION, SOLUTION INTRAVENOUS at 10:05

## 2021-05-01 RX ADMIN — SODIUM CHLORIDE: 0.9 INJECTION, SOLUTION INTRAVENOUS at 09:05

## 2021-05-01 RX ADMIN — PROMETHAZINE HYDROCHLORIDE AND CODEINE PHOSPHATE 7.5 ML: 10; 6.25 SOLUTION ORAL at 01:05

## 2021-05-01 RX ADMIN — MORPHINE SULFATE 3 MG: 2 INJECTION, SOLUTION INTRAMUSCULAR; INTRAVENOUS at 02:05

## 2021-05-01 RX ADMIN — PROMETHAZINE HYDROCHLORIDE AND CODEINE PHOSPHATE 7.5 ML: 10; 6.25 SOLUTION ORAL at 07:05

## 2021-05-01 RX ADMIN — SODIUM CHLORIDE, SODIUM LACTATE, POTASSIUM CHLORIDE, AND CALCIUM CHLORIDE 500 ML: .6; .31; .03; .02 INJECTION, SOLUTION INTRAVENOUS at 11:05

## 2021-05-01 RX ADMIN — MAGNESIUM SULFATE 2 G: 2 INJECTION INTRAVENOUS at 12:05

## 2021-05-01 RX ADMIN — HEPARIN SODIUM 5000 UNITS: 5000 INJECTION INTRAVENOUS; SUBCUTANEOUS at 02:05

## 2021-05-01 RX ADMIN — SODIUM CHLORIDE: 0.9 INJECTION, SOLUTION INTRAVENOUS at 11:05

## 2021-05-01 RX ADMIN — IBUPROFEN 600 MG: 600 TABLET ORAL at 02:05

## 2021-05-01 RX ADMIN — ONDANSETRON 4 MG: 2 INJECTION INTRAMUSCULAR; INTRAVENOUS at 03:05

## 2021-05-01 RX ADMIN — MELATONIN TAB 3 MG 6 MG: 3 TAB at 11:05

## 2021-05-01 RX ADMIN — PANTOPRAZOLE SODIUM 40 MG: 20 TABLET, DELAYED RELEASE ORAL at 01:05

## 2021-05-01 RX ADMIN — METHYLPREDNISOLONE SODIUM SUCCINATE 80 MG: 40 INJECTION, POWDER, FOR SOLUTION INTRAMUSCULAR; INTRAVENOUS at 11:05

## 2021-05-01 RX ADMIN — PIPERACILLIN SODIUM AND TAZOBACTAM SODIUM 4.5 G: 4; .5 INJECTION, POWDER, FOR SOLUTION INTRAVENOUS at 02:05

## 2021-05-01 RX ADMIN — COLCHICINE 0.6 MG: 0.6 TABLET, FILM COATED ORAL at 01:05

## 2021-05-01 RX ADMIN — PIPERACILLIN SODIUM AND TAZOBACTAM SODIUM 4.5 G: 4; .5 INJECTION, POWDER, FOR SOLUTION INTRAVENOUS at 10:05

## 2021-05-02 LAB
ALBUMIN SERPL BCP-MCNC: 2.9 G/DL (ref 3.5–5.2)
ALP SERPL-CCNC: 60 U/L (ref 55–135)
ALT SERPL W/O P-5'-P-CCNC: 74 U/L (ref 10–44)
ANION GAP SERPL CALC-SCNC: 12 MMOL/L (ref 8–16)
AST SERPL-CCNC: 47 U/L (ref 10–40)
BILIRUB SERPL-MCNC: 0.7 MG/DL (ref 0.1–1)
BUN SERPL-MCNC: 13 MG/DL (ref 6–20)
CALCIUM SERPL-MCNC: 8.5 MG/DL (ref 8.7–10.5)
CHLORIDE SERPL-SCNC: 105 MMOL/L (ref 95–110)
CO2 SERPL-SCNC: 19 MMOL/L (ref 23–29)
CREAT SERPL-MCNC: 0.8 MG/DL (ref 0.5–1.4)
DAT IGG-SP REAG RBC-IMP: NORMAL
ERYTHROCYTE [DISTWIDTH] IN BLOOD BY AUTOMATED COUNT: 12.9 % (ref 11.5–14.5)
EST. GFR  (AFRICAN AMERICAN): >60 ML/MIN/1.73 M^2
EST. GFR  (NON AFRICAN AMERICAN): >60 ML/MIN/1.73 M^2
GLUCOSE SERPL-MCNC: 141 MG/DL (ref 70–110)
HCT VFR BLD AUTO: 35.3 % (ref 40–54)
HGB BLD-MCNC: 12.4 G/DL (ref 14–18)
MAGNESIUM SERPL-MCNC: 2.1 MG/DL (ref 1.6–2.6)
MCH RBC QN AUTO: 30.9 PG (ref 27–31)
MCHC RBC AUTO-ENTMCNC: 35.1 G/DL (ref 32–36)
MCV RBC AUTO: 88 FL (ref 82–98)
PLATELET # BLD AUTO: 232 K/UL (ref 150–450)
PMV BLD AUTO: 10.2 FL (ref 9.2–12.9)
POTASSIUM SERPL-SCNC: 4.3 MMOL/L (ref 3.5–5.1)
PROT SERPL-MCNC: 6.7 G/DL (ref 6–8.4)
RBC # BLD AUTO: 4.01 M/UL (ref 4.6–6.2)
SODIUM SERPL-SCNC: 136 MMOL/L (ref 136–145)
TROPONIN I SERPL DL<=0.01 NG/ML-MCNC: 0.98 NG/ML (ref 0–0.03)
VANCOMYCIN TROUGH SERPL-MCNC: 8.4 UG/ML (ref 10–22)
WBC # BLD AUTO: 8.05 K/UL (ref 3.9–12.7)

## 2021-05-02 PROCEDURE — 25000003 PHARM REV CODE 250: Performed by: INTERNAL MEDICINE

## 2021-05-02 PROCEDURE — 94761 N-INVAS EAR/PLS OXIMETRY MLT: CPT

## 2021-05-02 PROCEDURE — 84484 ASSAY OF TROPONIN QUANT: CPT | Performed by: INTERNAL MEDICINE

## 2021-05-02 PROCEDURE — 99232 PR SUBSEQUENT HOSPITAL CARE,LEVL II: ICD-10-PCS | Mod: ,,, | Performed by: INTERNAL MEDICINE

## 2021-05-02 PROCEDURE — 99291 CRITICAL CARE FIRST HOUR: CPT | Mod: ,,, | Performed by: INTERNAL MEDICINE

## 2021-05-02 PROCEDURE — 80053 COMPREHEN METABOLIC PANEL: CPT | Performed by: STUDENT IN AN ORGANIZED HEALTH CARE EDUCATION/TRAINING PROGRAM

## 2021-05-02 PROCEDURE — 85613 RUSSELL VIPER VENOM DILUTED: CPT

## 2021-05-02 PROCEDURE — 99232 SBSQ HOSP IP/OBS MODERATE 35: CPT | Mod: ,,, | Performed by: INTERNAL MEDICINE

## 2021-05-02 PROCEDURE — 63600175 PHARM REV CODE 636 W HCPCS: Performed by: INTERNAL MEDICINE

## 2021-05-02 PROCEDURE — 87077 CULTURE AEROBIC IDENTIFY: CPT | Performed by: STUDENT IN AN ORGANIZED HEALTH CARE EDUCATION/TRAINING PROGRAM

## 2021-05-02 PROCEDURE — 93005 ELECTROCARDIOGRAM TRACING: CPT

## 2021-05-02 PROCEDURE — 99900035 HC TECH TIME PER 15 MIN (STAT)

## 2021-05-02 PROCEDURE — 87186 SC STD MICRODIL/AGAR DIL: CPT | Performed by: STUDENT IN AN ORGANIZED HEALTH CARE EDUCATION/TRAINING PROGRAM

## 2021-05-02 PROCEDURE — 25000003 PHARM REV CODE 250: Performed by: HOSPITALIST

## 2021-05-02 PROCEDURE — 86738 MYCOPLASMA ANTIBODY: CPT | Mod: 91 | Performed by: STUDENT IN AN ORGANIZED HEALTH CARE EDUCATION/TRAINING PROGRAM

## 2021-05-02 PROCEDURE — 20000000 HC ICU ROOM

## 2021-05-02 PROCEDURE — 63600175 PHARM REV CODE 636 W HCPCS: Performed by: STUDENT IN AN ORGANIZED HEALTH CARE EDUCATION/TRAINING PROGRAM

## 2021-05-02 PROCEDURE — 85598 HEXAGNAL PHOSPH PLTLT NEUTRL: CPT

## 2021-05-02 PROCEDURE — 80202 ASSAY OF VANCOMYCIN: CPT | Performed by: INTERNAL MEDICINE

## 2021-05-02 PROCEDURE — 86147 CARDIOLIPIN ANTIBODY EA IG: CPT | Mod: 59

## 2021-05-02 PROCEDURE — 87070 CULTURE OTHR SPECIMN AEROBIC: CPT | Performed by: STUDENT IN AN ORGANIZED HEALTH CARE EDUCATION/TRAINING PROGRAM

## 2021-05-02 PROCEDURE — 86317 IMMUNOASSAY INFECTIOUS AGENT: CPT | Mod: 59 | Performed by: STUDENT IN AN ORGANIZED HEALTH CARE EDUCATION/TRAINING PROGRAM

## 2021-05-02 PROCEDURE — 25000003 PHARM REV CODE 250: Performed by: STUDENT IN AN ORGANIZED HEALTH CARE EDUCATION/TRAINING PROGRAM

## 2021-05-02 PROCEDURE — 93010 ELECTROCARDIOGRAM REPORT: CPT | Mod: ,,, | Performed by: INTERNAL MEDICINE

## 2021-05-02 PROCEDURE — 83516 IMMUNOASSAY NONANTIBODY: CPT | Mod: 59

## 2021-05-02 PROCEDURE — 93010 EKG 12-LEAD: ICD-10-PCS | Mod: ,,, | Performed by: INTERNAL MEDICINE

## 2021-05-02 PROCEDURE — 83516 IMMUNOASSAY NONANTIBODY: CPT

## 2021-05-02 PROCEDURE — 86255 FLUORESCENT ANTIBODY SCREEN: CPT | Mod: 91

## 2021-05-02 PROCEDURE — 27000221 HC OXYGEN, UP TO 24 HOURS

## 2021-05-02 PROCEDURE — 86880 COOMBS TEST DIRECT: CPT

## 2021-05-02 PROCEDURE — 83735 ASSAY OF MAGNESIUM: CPT | Performed by: INTERNAL MEDICINE

## 2021-05-02 PROCEDURE — 99291 PR CRITICAL CARE, E/M 30-74 MINUTES: ICD-10-PCS | Mod: ,,, | Performed by: INTERNAL MEDICINE

## 2021-05-02 PROCEDURE — 85027 COMPLETE CBC AUTOMATED: CPT | Performed by: INTERNAL MEDICINE

## 2021-05-02 PROCEDURE — 86146 BETA-2 GLYCOPROTEIN ANTIBODY: CPT | Mod: 59

## 2021-05-02 PROCEDURE — 87205 SMEAR GRAM STAIN: CPT | Performed by: STUDENT IN AN ORGANIZED HEALTH CARE EDUCATION/TRAINING PROGRAM

## 2021-05-02 RX ORDER — ASPIRIN 325 MG
975 TABLET ORAL 3 TIMES DAILY
Status: DISCONTINUED | OUTPATIENT
Start: 2021-05-02 | End: 2021-05-02

## 2021-05-02 RX ORDER — ASPIRIN 325 MG
975 TABLET ORAL 3 TIMES DAILY
Status: DISCONTINUED | OUTPATIENT
Start: 2021-05-02 | End: 2021-05-07 | Stop reason: HOSPADM

## 2021-05-02 RX ORDER — DICYCLOMINE HYDROCHLORIDE 10 MG/1
10 CAPSULE ORAL ONCE
Status: COMPLETED | OUTPATIENT
Start: 2021-05-02 | End: 2021-05-02

## 2021-05-02 RX ORDER — FUROSEMIDE 20 MG/1
20 TABLET ORAL ONCE
Status: COMPLETED | OUTPATIENT
Start: 2021-05-02 | End: 2021-05-02

## 2021-05-02 RX ORDER — MORPHINE SULFATE 2 MG/ML
2 INJECTION, SOLUTION INTRAMUSCULAR; INTRAVENOUS EVERY 8 HOURS PRN
Status: DISCONTINUED | OUTPATIENT
Start: 2021-05-02 | End: 2021-05-04

## 2021-05-02 RX ADMIN — ASPIRIN 325 MG ORAL TABLET 975 MG: 325 PILL ORAL at 02:05

## 2021-05-02 RX ADMIN — VANCOMYCIN HYDROCHLORIDE 1250 MG: 1.25 INJECTION, POWDER, LYOPHILIZED, FOR SOLUTION INTRAVENOUS at 12:05

## 2021-05-02 RX ADMIN — PROMETHAZINE HYDROCHLORIDE AND CODEINE PHOSPHATE 7.5 ML: 10; 6.25 SOLUTION ORAL at 06:05

## 2021-05-02 RX ADMIN — VANCOMYCIN HYDROCHLORIDE 1000 MG: 1 INJECTION, POWDER, LYOPHILIZED, FOR SOLUTION INTRAVENOUS at 03:05

## 2021-05-02 RX ADMIN — PIPERACILLIN SODIUM AND TAZOBACTAM SODIUM 4.5 G: 4; .5 INJECTION, POWDER, FOR SOLUTION INTRAVENOUS at 11:05

## 2021-05-02 RX ADMIN — AZITHROMYCIN MONOHYDRATE 500 MG: 500 INJECTION, POWDER, LYOPHILIZED, FOR SOLUTION INTRAVENOUS at 11:05

## 2021-05-02 RX ADMIN — PROMETHAZINE HYDROCHLORIDE 12.5 MG: 25 INJECTION INTRAMUSCULAR; INTRAVENOUS at 09:05

## 2021-05-02 RX ADMIN — SODIUM CHLORIDE: 0.9 INJECTION, SOLUTION INTRAVENOUS at 11:05

## 2021-05-02 RX ADMIN — PIPERACILLIN SODIUM AND TAZOBACTAM SODIUM 4.5 G: 4; .5 INJECTION, POWDER, FOR SOLUTION INTRAVENOUS at 05:05

## 2021-05-02 RX ADMIN — MORPHINE SULFATE 2 MG: 2 INJECTION, SOLUTION INTRAMUSCULAR; INTRAVENOUS at 10:05

## 2021-05-02 RX ADMIN — VALSARTAN 20 MG: 40 TABLET, FILM COATED ORAL at 05:05

## 2021-05-02 RX ADMIN — METHYLPREDNISOLONE SODIUM SUCCINATE 125 MG: 40 INJECTION, POWDER, FOR SOLUTION INTRAMUSCULAR; INTRAVENOUS at 05:05

## 2021-05-02 RX ADMIN — METHYLPREDNISOLONE SODIUM SUCCINATE 125 MG: 40 INJECTION, POWDER, FOR SOLUTION INTRAMUSCULAR; INTRAVENOUS at 12:05

## 2021-05-02 RX ADMIN — PIPERACILLIN SODIUM AND TAZOBACTAM SODIUM 4.5 G: 4; .5 INJECTION, POWDER, FOR SOLUTION INTRAVENOUS at 02:05

## 2021-05-02 RX ADMIN — HEPARIN SODIUM 5000 UNITS: 5000 INJECTION INTRAVENOUS; SUBCUTANEOUS at 09:05

## 2021-05-02 RX ADMIN — PANTOPRAZOLE SODIUM 40 MG: 20 TABLET, DELAYED RELEASE ORAL at 08:05

## 2021-05-02 RX ADMIN — ASPIRIN 325 MG ORAL TABLET 975 MG: 325 PILL ORAL at 05:05

## 2021-05-02 RX ADMIN — HEPARIN SODIUM 5000 UNITS: 5000 INJECTION INTRAVENOUS; SUBCUTANEOUS at 05:05

## 2021-05-02 RX ADMIN — ASPIRIN 325 MG ORAL TABLET 975 MG: 325 PILL ORAL at 08:05

## 2021-05-02 RX ADMIN — DICYCLOMINE HYDROCHLORIDE 10 MG: 10 CAPSULE ORAL at 09:05

## 2021-05-02 RX ADMIN — COLCHICINE 0.6 MG: 0.6 TABLET, FILM COATED ORAL at 08:05

## 2021-05-02 RX ADMIN — ASPIRIN 325 MG ORAL TABLET 975 MG: 325 PILL ORAL at 09:05

## 2021-05-02 RX ADMIN — METOPROLOL SUCCINATE 12.5 MG: 25 TABLET, EXTENDED RELEASE ORAL at 08:05

## 2021-05-02 RX ADMIN — FUROSEMIDE 20 MG: 20 TABLET ORAL at 10:05

## 2021-05-02 RX ADMIN — COLCHICINE 0.6 MG: 0.6 TABLET, FILM COATED ORAL at 09:05

## 2021-05-02 RX ADMIN — VANCOMYCIN HYDROCHLORIDE 1250 MG: 1.25 INJECTION, POWDER, LYOPHILIZED, FOR SOLUTION INTRAVENOUS at 09:05

## 2021-05-02 RX ADMIN — PROMETHAZINE HYDROCHLORIDE AND CODEINE PHOSPHATE 7.5 ML: 10; 6.25 SOLUTION ORAL at 09:05

## 2021-05-02 RX ADMIN — ONDANSETRON 4 MG: 2 INJECTION INTRAMUSCULAR; INTRAVENOUS at 05:05

## 2021-05-02 RX ADMIN — HEPARIN SODIUM 5000 UNITS: 5000 INJECTION INTRAVENOUS; SUBCUTANEOUS at 02:05

## 2021-05-03 LAB
ALBUMIN SERPL BCP-MCNC: 2.8 G/DL (ref 3.5–5.2)
ALP SERPL-CCNC: 57 U/L (ref 55–135)
ALT SERPL W/O P-5'-P-CCNC: 56 U/L (ref 10–44)
ANA PATTERN 1: NORMAL
ANA SER QL IF: POSITIVE
ANA TITR SER IF: NORMAL {TITER}
ANION GAP SERPL CALC-SCNC: 10 MMOL/L (ref 8–16)
AST SERPL-CCNC: 24 U/L (ref 10–40)
BILIRUB SERPL-MCNC: 0.5 MG/DL (ref 0.1–1)
BUN SERPL-MCNC: 16 MG/DL (ref 6–20)
C DIFF GDH STL QL: POSITIVE
C DIFF TOX A+B STL QL IA: NEGATIVE
C DIFF TOX GENS STL QL NAA+PROBE: POSITIVE
CALCIUM SERPL-MCNC: 8.3 MG/DL (ref 8.7–10.5)
CHLORIDE SERPL-SCNC: 105 MMOL/L (ref 95–110)
CO2 SERPL-SCNC: 21 MMOL/L (ref 23–29)
CREAT SERPL-MCNC: 0.9 MG/DL (ref 0.5–1.4)
ERYTHROCYTE [DISTWIDTH] IN BLOOD BY AUTOMATED COUNT: 13 % (ref 11.5–14.5)
EST. GFR  (AFRICAN AMERICAN): >60 ML/MIN/1.73 M^2
EST. GFR  (NON AFRICAN AMERICAN): >60 ML/MIN/1.73 M^2
GLUCOSE SERPL-MCNC: 129 MG/DL (ref 70–110)
HCT VFR BLD AUTO: 34.8 % (ref 40–54)
HGB BLD-MCNC: 12 G/DL (ref 14–18)
HIV 1+2 AB+HIV1 P24 AG SERPL QL IA: NEGATIVE
MAGNESIUM SERPL-MCNC: 2 MG/DL (ref 1.6–2.6)
MCH RBC QN AUTO: 30.5 PG (ref 27–31)
MCHC RBC AUTO-ENTMCNC: 34.5 G/DL (ref 32–36)
MCV RBC AUTO: 88 FL (ref 82–98)
PLATELET # BLD AUTO: 347 K/UL (ref 150–450)
PMV BLD AUTO: 9.9 FL (ref 9.2–12.9)
POTASSIUM SERPL-SCNC: 3.7 MMOL/L (ref 3.5–5.1)
PROT SERPL-MCNC: 6.6 G/DL (ref 6–8.4)
RBC # BLD AUTO: 3.94 M/UL (ref 4.6–6.2)
SODIUM SERPL-SCNC: 136 MMOL/L (ref 136–145)
VANCOMYCIN TROUGH SERPL-MCNC: 18.7 UG/ML (ref 10–22)
WBC # BLD AUTO: 11.7 K/UL (ref 3.9–12.7)

## 2021-05-03 PROCEDURE — 99232 SBSQ HOSP IP/OBS MODERATE 35: CPT | Mod: ,,, | Performed by: INTERNAL MEDICINE

## 2021-05-03 PROCEDURE — 93005 ELECTROCARDIOGRAM TRACING: CPT

## 2021-05-03 PROCEDURE — 25500020 PHARM REV CODE 255: Performed by: INTERNAL MEDICINE

## 2021-05-03 PROCEDURE — 99233 SBSQ HOSP IP/OBS HIGH 50: CPT | Mod: ,,, | Performed by: INTERNAL MEDICINE

## 2021-05-03 PROCEDURE — 25000003 PHARM REV CODE 250: Performed by: HOSPITALIST

## 2021-05-03 PROCEDURE — 63600175 PHARM REV CODE 636 W HCPCS: Performed by: STUDENT IN AN ORGANIZED HEALTH CARE EDUCATION/TRAINING PROGRAM

## 2021-05-03 PROCEDURE — 87449 NOS EACH ORGANISM AG IA: CPT | Performed by: STUDENT IN AN ORGANIZED HEALTH CARE EDUCATION/TRAINING PROGRAM

## 2021-05-03 PROCEDURE — 80053 COMPREHEN METABOLIC PANEL: CPT | Performed by: STUDENT IN AN ORGANIZED HEALTH CARE EDUCATION/TRAINING PROGRAM

## 2021-05-03 PROCEDURE — 87493 C DIFF AMPLIFIED PROBE: CPT | Performed by: STUDENT IN AN ORGANIZED HEALTH CARE EDUCATION/TRAINING PROGRAM

## 2021-05-03 PROCEDURE — 25000003 PHARM REV CODE 250: Performed by: STUDENT IN AN ORGANIZED HEALTH CARE EDUCATION/TRAINING PROGRAM

## 2021-05-03 PROCEDURE — 25000003 PHARM REV CODE 250: Performed by: INTERNAL MEDICINE

## 2021-05-03 PROCEDURE — 99232 PR SUBSEQUENT HOSPITAL CARE,LEVL II: ICD-10-PCS | Mod: ,,, | Performed by: INTERNAL MEDICINE

## 2021-05-03 PROCEDURE — 99233 PR SUBSEQUENT HOSPITAL CARE,LEVL III: ICD-10-PCS | Mod: ,,, | Performed by: INTERNAL MEDICINE

## 2021-05-03 PROCEDURE — 63600175 PHARM REV CODE 636 W HCPCS: Performed by: INTERNAL MEDICINE

## 2021-05-03 PROCEDURE — 20000000 HC ICU ROOM

## 2021-05-03 PROCEDURE — 93010 EKG 12-LEAD: ICD-10-PCS | Mod: ,,, | Performed by: INTERNAL MEDICINE

## 2021-05-03 PROCEDURE — 27000221 HC OXYGEN, UP TO 24 HOURS

## 2021-05-03 PROCEDURE — 83735 ASSAY OF MAGNESIUM: CPT | Performed by: INTERNAL MEDICINE

## 2021-05-03 PROCEDURE — 93010 ELECTROCARDIOGRAM REPORT: CPT | Mod: ,,, | Performed by: INTERNAL MEDICINE

## 2021-05-03 PROCEDURE — 87324 CLOSTRIDIUM AG IA: CPT | Performed by: STUDENT IN AN ORGANIZED HEALTH CARE EDUCATION/TRAINING PROGRAM

## 2021-05-03 PROCEDURE — 85027 COMPLETE CBC AUTOMATED: CPT | Performed by: INTERNAL MEDICINE

## 2021-05-03 PROCEDURE — A9577 INJ MULTIHANCE: HCPCS | Performed by: INTERNAL MEDICINE

## 2021-05-03 PROCEDURE — 94761 N-INVAS EAR/PLS OXIMETRY MLT: CPT

## 2021-05-03 PROCEDURE — 99900035 HC TECH TIME PER 15 MIN (STAT)

## 2021-05-03 PROCEDURE — 80202 ASSAY OF VANCOMYCIN: CPT | Performed by: INTERNAL MEDICINE

## 2021-05-03 RX ORDER — VANCOMYCIN HCL IN 5 % DEXTROSE 1G/250ML
1000 PLASTIC BAG, INJECTION (ML) INTRAVENOUS
Status: DISCONTINUED | OUTPATIENT
Start: 2021-05-04 | End: 2021-05-04

## 2021-05-03 RX ORDER — SUCRALFATE 1 G/10ML
1 SUSPENSION ORAL
Status: DISCONTINUED | OUTPATIENT
Start: 2021-05-03 | End: 2021-05-07 | Stop reason: HOSPADM

## 2021-05-03 RX ORDER — VANCOMYCIN HCL IN 5 % DEXTROSE 1G/250ML
1000 PLASTIC BAG, INJECTION (ML) INTRAVENOUS
Status: DISCONTINUED | OUTPATIENT
Start: 2021-05-03 | End: 2021-05-03

## 2021-05-03 RX ORDER — OXYCODONE HYDROCHLORIDE 5 MG/1
5 TABLET ORAL ONCE
Status: COMPLETED | OUTPATIENT
Start: 2021-05-03 | End: 2021-05-03

## 2021-05-03 RX ADMIN — HEPARIN SODIUM 5000 UNITS: 5000 INJECTION INTRAVENOUS; SUBCUTANEOUS at 06:05

## 2021-05-03 RX ADMIN — VALSARTAN 20 MG: 40 TABLET, FILM COATED ORAL at 01:05

## 2021-05-03 RX ADMIN — VANCOMYCIN HYDROCHLORIDE 1000 MG: 1 INJECTION, POWDER, LYOPHILIZED, FOR SOLUTION INTRAVENOUS at 08:05

## 2021-05-03 RX ADMIN — PIPERACILLIN SODIUM AND TAZOBACTAM SODIUM 4.5 G: 4; .5 INJECTION, POWDER, FOR SOLUTION INTRAVENOUS at 02:05

## 2021-05-03 RX ADMIN — SUCRALFATE 1 G: 1 SUSPENSION ORAL at 08:05

## 2021-05-03 RX ADMIN — COLCHICINE 0.6 MG: 0.6 TABLET, FILM COATED ORAL at 08:05

## 2021-05-03 RX ADMIN — PANTOPRAZOLE SODIUM 40 MG: 20 TABLET, DELAYED RELEASE ORAL at 08:05

## 2021-05-03 RX ADMIN — HEPARIN SODIUM 5000 UNITS: 5000 INJECTION INTRAVENOUS; SUBCUTANEOUS at 10:05

## 2021-05-03 RX ADMIN — PIPERACILLIN SODIUM AND TAZOBACTAM SODIUM 4.5 G: 4; .5 INJECTION, POWDER, FOR SOLUTION INTRAVENOUS at 06:05

## 2021-05-03 RX ADMIN — PROMETHAZINE HYDROCHLORIDE 12.5 MG: 25 INJECTION INTRAMUSCULAR; INTRAVENOUS at 03:05

## 2021-05-03 RX ADMIN — METHYLPREDNISOLONE SODIUM SUCCINATE 125 MG: 40 INJECTION, POWDER, FOR SOLUTION INTRAMUSCULAR; INTRAVENOUS at 12:05

## 2021-05-03 RX ADMIN — METHYLPREDNISOLONE SODIUM SUCCINATE 125 MG: 40 INJECTION, POWDER, FOR SOLUTION INTRAMUSCULAR; INTRAVENOUS at 05:05

## 2021-05-03 RX ADMIN — OXYCODONE HYDROCHLORIDE 5 MG: 5 TABLET ORAL at 02:05

## 2021-05-03 RX ADMIN — ASPIRIN 325 MG ORAL TABLET 975 MG: 325 PILL ORAL at 08:05

## 2021-05-03 RX ADMIN — SUCRALFATE 1 G: 1 SUSPENSION ORAL at 05:05

## 2021-05-03 RX ADMIN — HEPARIN SODIUM 5000 UNITS: 5000 INJECTION INTRAVENOUS; SUBCUTANEOUS at 02:05

## 2021-05-03 RX ADMIN — PROMETHAZINE HYDROCHLORIDE AND CODEINE PHOSPHATE 7.5 ML: 10; 6.25 SOLUTION ORAL at 03:05

## 2021-05-03 RX ADMIN — METHYLPREDNISOLONE SODIUM SUCCINATE 125 MG: 40 INJECTION, POWDER, FOR SOLUTION INTRAMUSCULAR; INTRAVENOUS at 06:05

## 2021-05-03 RX ADMIN — SODIUM CHLORIDE 5 ML/HR: 0.9 INJECTION, SOLUTION INTRAVENOUS at 11:05

## 2021-05-03 RX ADMIN — MORPHINE SULFATE 2 MG: 2 INJECTION, SOLUTION INTRAMUSCULAR; INTRAVENOUS at 07:05

## 2021-05-03 RX ADMIN — VANCOMYCIN HYDROCHLORIDE 125 MG: KIT at 11:05

## 2021-05-03 RX ADMIN — VANCOMYCIN HYDROCHLORIDE 1250 MG: 1.25 INJECTION, POWDER, LYOPHILIZED, FOR SOLUTION INTRAVENOUS at 03:05

## 2021-05-03 RX ADMIN — AZITHROMYCIN MONOHYDRATE 500 MG: 500 INJECTION, POWDER, LYOPHILIZED, FOR SOLUTION INTRAVENOUS at 10:05

## 2021-05-03 RX ADMIN — GADOBENATE DIMEGLUMINE 20 ML: 529 INJECTION, SOLUTION INTRAVENOUS at 05:05

## 2021-05-03 RX ADMIN — ASPIRIN 325 MG ORAL TABLET 975 MG: 325 PILL ORAL at 02:05

## 2021-05-03 RX ADMIN — METHYLPREDNISOLONE SODIUM SUCCINATE 125 MG: 40 INJECTION, POWDER, FOR SOLUTION INTRAMUSCULAR; INTRAVENOUS at 11:05

## 2021-05-04 PROBLEM — A49.8 CLOSTRIDIUM DIFFICILE INFECTION: Status: ACTIVE | Noted: 2021-05-04

## 2021-05-04 PROBLEM — J96.01 ACUTE HYPOXEMIC RESPIRATORY FAILURE: Status: ACTIVE | Noted: 2021-05-04

## 2021-05-04 PROBLEM — J15.20 STAPHYLOCOCCAL PNEUMONIA: Status: ACTIVE | Noted: 2021-05-04

## 2021-05-04 PROBLEM — J96.02 ACUTE HYPERCAPNIC RESPIRATORY FAILURE: Status: ACTIVE | Noted: 2021-05-04

## 2021-05-04 PROBLEM — A41.9 SEPSIS: Status: RESOLVED | Noted: 2021-05-01 | Resolved: 2021-05-04

## 2021-05-04 PROBLEM — R79.89 ELEVATED TROPONIN: Status: RESOLVED | Noted: 2021-04-30 | Resolved: 2021-05-04

## 2021-05-04 LAB
ALBUMIN SERPL BCP-MCNC: 2.6 G/DL (ref 3.5–5.2)
ALP SERPL-CCNC: 49 U/L (ref 55–135)
ALT SERPL W/O P-5'-P-CCNC: 39 U/L (ref 10–44)
ANION GAP SERPL CALC-SCNC: 9 MMOL/L (ref 8–16)
AST SERPL-CCNC: 15 U/L (ref 10–40)
BILIRUB SERPL-MCNC: 0.3 MG/DL (ref 0.1–1)
BUN SERPL-MCNC: 20 MG/DL (ref 6–20)
CALCIUM SERPL-MCNC: 8 MG/DL (ref 8.7–10.5)
CHLORIDE SERPL-SCNC: 106 MMOL/L (ref 95–110)
CO2 SERPL-SCNC: 21 MMOL/L (ref 23–29)
CREAT SERPL-MCNC: 0.9 MG/DL (ref 0.5–1.4)
ERYTHROCYTE [DISTWIDTH] IN BLOOD BY AUTOMATED COUNT: 13.3 % (ref 11.5–14.5)
EST. GFR  (AFRICAN AMERICAN): >60 ML/MIN/1.73 M^2
EST. GFR  (NON AFRICAN AMERICAN): >60 ML/MIN/1.73 M^2
GLUCOSE SERPL-MCNC: 129 MG/DL (ref 70–110)
HCT VFR BLD AUTO: 34.5 % (ref 40–54)
HGB BLD-MCNC: 11.9 G/DL (ref 14–18)
MAGNESIUM SERPL-MCNC: 2.1 MG/DL (ref 1.6–2.6)
MCH RBC QN AUTO: 29.8 PG (ref 27–31)
MCHC RBC AUTO-ENTMCNC: 34.5 G/DL (ref 32–36)
MCV RBC AUTO: 87 FL (ref 82–98)
PHOSPHATE SERPL-MCNC: 3 MG/DL (ref 2.7–4.5)
PLATELET # BLD AUTO: 357 K/UL (ref 150–450)
PMV BLD AUTO: 9.3 FL (ref 9.2–12.9)
POCT GLUCOSE: 118 MG/DL (ref 70–110)
POCT GLUCOSE: 152 MG/DL (ref 70–110)
POTASSIUM SERPL-SCNC: 4.1 MMOL/L (ref 3.5–5.1)
PROT SERPL-MCNC: 6.4 G/DL (ref 6–8.4)
PROTEINASE3 IGG SER-ACNC: <0.2 U
RBC # BLD AUTO: 3.99 M/UL (ref 4.6–6.2)
SODIUM SERPL-SCNC: 136 MMOL/L (ref 136–145)
WBC # BLD AUTO: 8.68 K/UL (ref 3.9–12.7)

## 2021-05-04 PROCEDURE — 25000242 PHARM REV CODE 250 ALT 637 W/ HCPCS: Performed by: STUDENT IN AN ORGANIZED HEALTH CARE EDUCATION/TRAINING PROGRAM

## 2021-05-04 PROCEDURE — 63600175 PHARM REV CODE 636 W HCPCS: Performed by: STUDENT IN AN ORGANIZED HEALTH CARE EDUCATION/TRAINING PROGRAM

## 2021-05-04 PROCEDURE — 83735 ASSAY OF MAGNESIUM: CPT | Performed by: INTERNAL MEDICINE

## 2021-05-04 PROCEDURE — 93005 ELECTROCARDIOGRAM TRACING: CPT

## 2021-05-04 PROCEDURE — 85027 COMPLETE CBC AUTOMATED: CPT | Performed by: INTERNAL MEDICINE

## 2021-05-04 PROCEDURE — 94761 N-INVAS EAR/PLS OXIMETRY MLT: CPT

## 2021-05-04 PROCEDURE — 93010 ELECTROCARDIOGRAM REPORT: CPT | Mod: ,,, | Performed by: PEDIATRICS

## 2021-05-04 PROCEDURE — 11000001 HC ACUTE MED/SURG PRIVATE ROOM

## 2021-05-04 PROCEDURE — 93010 EKG 12-LEAD: ICD-10-PCS | Mod: ,,, | Performed by: PEDIATRICS

## 2021-05-04 PROCEDURE — 99233 PR SUBSEQUENT HOSPITAL CARE,LEVL III: ICD-10-PCS | Mod: ,,, | Performed by: HOSPITALIST

## 2021-05-04 PROCEDURE — 25000003 PHARM REV CODE 250: Performed by: HOSPITALIST

## 2021-05-04 PROCEDURE — 94640 AIRWAY INHALATION TREATMENT: CPT

## 2021-05-04 PROCEDURE — 63600175 PHARM REV CODE 636 W HCPCS: Performed by: INTERNAL MEDICINE

## 2021-05-04 PROCEDURE — 25000003 PHARM REV CODE 250: Performed by: STUDENT IN AN ORGANIZED HEALTH CARE EDUCATION/TRAINING PROGRAM

## 2021-05-04 PROCEDURE — 25000003 PHARM REV CODE 250: Performed by: INTERNAL MEDICINE

## 2021-05-04 PROCEDURE — 36415 COLL VENOUS BLD VENIPUNCTURE: CPT

## 2021-05-04 PROCEDURE — 84100 ASSAY OF PHOSPHORUS: CPT | Performed by: STUDENT IN AN ORGANIZED HEALTH CARE EDUCATION/TRAINING PROGRAM

## 2021-05-04 PROCEDURE — 80053 COMPREHEN METABOLIC PANEL: CPT | Performed by: STUDENT IN AN ORGANIZED HEALTH CARE EDUCATION/TRAINING PROGRAM

## 2021-05-04 PROCEDURE — 99900035 HC TECH TIME PER 15 MIN (STAT)

## 2021-05-04 PROCEDURE — 99233 SBSQ HOSP IP/OBS HIGH 50: CPT | Mod: ,,, | Performed by: HOSPITALIST

## 2021-05-04 PROCEDURE — 63600175 PHARM REV CODE 636 W HCPCS: Performed by: HOSPITALIST

## 2021-05-04 PROCEDURE — 94799 UNLISTED PULMONARY SVC/PX: CPT

## 2021-05-04 PROCEDURE — 99222 1ST HOSP IP/OBS MODERATE 55: CPT | Mod: ,,, | Performed by: INTERNAL MEDICINE

## 2021-05-04 PROCEDURE — 99222 PR INITIAL HOSPITAL CARE,LEVL II: ICD-10-PCS | Mod: ,,, | Performed by: INTERNAL MEDICINE

## 2021-05-04 RX ORDER — MORPHINE SULFATE 2 MG/ML
2 INJECTION, SOLUTION INTRAMUSCULAR; INTRAVENOUS ONCE AS NEEDED
Status: COMPLETED | OUTPATIENT
Start: 2021-05-04 | End: 2021-05-04

## 2021-05-04 RX ORDER — LEVOFLOXACIN 750 MG/1
750 TABLET ORAL DAILY
Status: DISCONTINUED | OUTPATIENT
Start: 2021-05-04 | End: 2021-05-04

## 2021-05-04 RX ORDER — LEVOFLOXACIN 750 MG/1
750 TABLET ORAL DAILY
Status: COMPLETED | OUTPATIENT
Start: 2021-05-04 | End: 2021-05-06

## 2021-05-04 RX ORDER — ALBUTEROL SULFATE 90 UG/1
1-2 AEROSOL, METERED RESPIRATORY (INHALATION) EVERY 6 HOURS PRN
Qty: 18 G | Refills: 2 | Status: SHIPPED | OUTPATIENT
Start: 2021-05-04

## 2021-05-04 RX ORDER — MUPIROCIN 20 MG/G
OINTMENT TOPICAL 2 TIMES DAILY
Status: DISCONTINUED | OUTPATIENT
Start: 2021-05-04 | End: 2021-05-07 | Stop reason: HOSPADM

## 2021-05-04 RX ORDER — VANCOMYCIN HCL IN 5 % DEXTROSE 1G/250ML
1000 PLASTIC BAG, INJECTION (ML) INTRAVENOUS
Status: DISCONTINUED | OUTPATIENT
Start: 2021-05-04 | End: 2021-05-05

## 2021-05-04 RX ORDER — TRAMADOL HYDROCHLORIDE 50 MG/1
50 TABLET ORAL EVERY 6 HOURS PRN
Status: DISCONTINUED | OUTPATIENT
Start: 2021-05-04 | End: 2021-05-07 | Stop reason: HOSPADM

## 2021-05-04 RX ORDER — ENOXAPARIN SODIUM 100 MG/ML
40 INJECTION SUBCUTANEOUS EVERY 24 HOURS
Status: DISCONTINUED | OUTPATIENT
Start: 2021-05-04 | End: 2021-05-07 | Stop reason: HOSPADM

## 2021-05-04 RX ORDER — VANCOMYCIN HYDROCHLORIDE 125 MG/1
125 CAPSULE ORAL EVERY 6 HOURS
Qty: 40 CAPSULE | Refills: 0 | Status: SHIPPED | OUTPATIENT
Start: 2021-05-04 | End: 2021-05-17

## 2021-05-04 RX ORDER — IPRATROPIUM BROMIDE AND ALBUTEROL SULFATE 2.5; .5 MG/3ML; MG/3ML
3 SOLUTION RESPIRATORY (INHALATION) EVERY 4 HOURS
Status: DISCONTINUED | OUTPATIENT
Start: 2021-05-04 | End: 2021-05-04

## 2021-05-04 RX ORDER — INSULIN ASPART 100 [IU]/ML
0-5 INJECTION, SOLUTION INTRAVENOUS; SUBCUTANEOUS
Status: DISCONTINUED | OUTPATIENT
Start: 2021-05-04 | End: 2021-05-05

## 2021-05-04 RX ORDER — PREDNISONE 20 MG/1
80 TABLET ORAL DAILY
Status: DISCONTINUED | OUTPATIENT
Start: 2021-05-05 | End: 2021-05-07 | Stop reason: HOSPADM

## 2021-05-04 RX ADMIN — SUCRALFATE 1 G: 1 SUSPENSION ORAL at 10:05

## 2021-05-04 RX ADMIN — ONDANSETRON 4 MG: 2 INJECTION INTRAMUSCULAR; INTRAVENOUS at 09:05

## 2021-05-04 RX ADMIN — COLCHICINE 0.6 MG: 0.6 TABLET, FILM COATED ORAL at 09:05

## 2021-05-04 RX ADMIN — VANCOMYCIN HYDROCHLORIDE 1000 MG: 1 INJECTION, POWDER, LYOPHILIZED, FOR SOLUTION INTRAVENOUS at 01:05

## 2021-05-04 RX ADMIN — VALSARTAN 20 MG: 40 TABLET, FILM COATED ORAL at 11:05

## 2021-05-04 RX ADMIN — METHYLPREDNISOLONE SODIUM SUCCINATE 125 MG: 40 INJECTION, POWDER, FOR SOLUTION INTRAMUSCULAR; INTRAVENOUS at 11:05

## 2021-05-04 RX ADMIN — PANTOPRAZOLE SODIUM 40 MG: 20 TABLET, DELAYED RELEASE ORAL at 09:05

## 2021-05-04 RX ADMIN — IPRATROPIUM BROMIDE AND ALBUTEROL SULFATE 3 ML: .5; 2.5 SOLUTION RESPIRATORY (INHALATION) at 08:05

## 2021-05-04 RX ADMIN — PIPERACILLIN SODIUM AND TAZOBACTAM SODIUM 4.5 G: 4; .5 INJECTION, POWDER, FOR SOLUTION INTRAVENOUS at 12:05

## 2021-05-04 RX ADMIN — ASPIRIN 325 MG ORAL TABLET 975 MG: 325 PILL ORAL at 09:05

## 2021-05-04 RX ADMIN — MUPIROCIN: 20 OINTMENT TOPICAL at 09:05

## 2021-05-04 RX ADMIN — VANCOMYCIN HYDROCHLORIDE 125 MG: KIT at 06:05

## 2021-05-04 RX ADMIN — ASPIRIN 325 MG ORAL TABLET 975 MG: 325 PILL ORAL at 03:05

## 2021-05-04 RX ADMIN — ASPIRIN 325 MG ORAL TABLET 975 MG: 325 PILL ORAL at 10:05

## 2021-05-04 RX ADMIN — METHYLPREDNISOLONE SODIUM SUCCINATE 125 MG: 40 INJECTION, POWDER, FOR SOLUTION INTRAMUSCULAR; INTRAVENOUS at 05:05

## 2021-05-04 RX ADMIN — MUPIROCIN: 20 OINTMENT TOPICAL at 10:05

## 2021-05-04 RX ADMIN — MORPHINE SULFATE 2 MG: 2 INJECTION, SOLUTION INTRAMUSCULAR; INTRAVENOUS at 09:05

## 2021-05-04 RX ADMIN — METOPROLOL SUCCINATE 12.5 MG: 25 TABLET, EXTENDED RELEASE ORAL at 09:05

## 2021-05-04 RX ADMIN — LEVOFLOXACIN 750 MG: 750 TABLET, FILM COATED ORAL at 11:05

## 2021-05-04 RX ADMIN — PIPERACILLIN SODIUM AND TAZOBACTAM SODIUM 4.5 G: 4; .5 INJECTION, POWDER, FOR SOLUTION INTRAVENOUS at 09:05

## 2021-05-04 RX ADMIN — VANCOMYCIN HYDROCHLORIDE 125 MG: KIT at 05:05

## 2021-05-04 RX ADMIN — VANCOMYCIN HYDROCHLORIDE 125 MG: KIT at 11:05

## 2021-05-04 RX ADMIN — HEPARIN SODIUM 5000 UNITS: 5000 INJECTION INTRAVENOUS; SUBCUTANEOUS at 05:05

## 2021-05-04 RX ADMIN — COLCHICINE 0.6 MG: 0.6 TABLET, FILM COATED ORAL at 10:05

## 2021-05-04 RX ADMIN — VANCOMYCIN HYDROCHLORIDE 1000 MG: 1 INJECTION, POWDER, LYOPHILIZED, FOR SOLUTION INTRAVENOUS at 05:05

## 2021-05-04 RX ADMIN — SUCRALFATE 1 G: 1 SUSPENSION ORAL at 06:05

## 2021-05-04 RX ADMIN — ENOXAPARIN SODIUM 40 MG: 40 INJECTION SUBCUTANEOUS at 06:05

## 2021-05-04 RX ADMIN — IPRATROPIUM BROMIDE AND ALBUTEROL SULFATE 3 ML: .5; 2.5 SOLUTION RESPIRATORY (INHALATION) at 03:05

## 2021-05-04 RX ADMIN — SUCRALFATE 1 G: 1 SUSPENSION ORAL at 11:05

## 2021-05-05 LAB
ALBUMIN SERPL BCP-MCNC: 2.7 G/DL (ref 3.5–5.2)
ALP SERPL-CCNC: 45 U/L (ref 55–135)
ALT SERPL W/O P-5'-P-CCNC: 39 U/L (ref 10–44)
ANCA AB TITR SER IF: NORMAL TITER
ANION GAP SERPL CALC-SCNC: 11 MMOL/L (ref 8–16)
ANTI SM ANTIBODY: 0.07 RATIO (ref 0–0.99)
ANTI SM/RNP ANTIBODY: 0.08 RATIO (ref 0–0.99)
ANTI-SM INTERPRETATION: NEGATIVE
ANTI-SM/RNP INTERPRETATION: NEGATIVE
ANTI-SSA ANTIBODY: 5.58 RATIO (ref 0–0.99)
ANTI-SSA INTERPRETATION: POSITIVE
ANTI-SSB ANTIBODY: 0.15 RATIO (ref 0–0.99)
ANTI-SSB INTERPRETATION: NEGATIVE
APTT HEX PL PPP: POSITIVE S
AST SERPL-CCNC: 16 U/L (ref 10–40)
BACTERIA SPEC AEROBE CULT: ABNORMAL
BACTERIA SPEC AEROBE CULT: ABNORMAL
BILIRUB SERPL-MCNC: 0.3 MG/DL (ref 0.1–1)
BUN SERPL-MCNC: 18 MG/DL (ref 6–20)
CALCIUM SERPL-MCNC: 8.3 MG/DL (ref 8.7–10.5)
CARDIOLIPIN IGG SER IA-ACNC: <9.4 GPL (ref 0–14.99)
CARDIOLIPIN IGM SER IA-ACNC: 12.4 MPL (ref 0–12.49)
CHLORIDE SERPL-SCNC: 107 MMOL/L (ref 95–110)
CO2 SERPL-SCNC: 21 MMOL/L (ref 23–29)
CREAT SERPL-MCNC: 0.9 MG/DL (ref 0.5–1.4)
DAT IGG-SP REAG RBC-IMP: NORMAL
DSDNA AB SER-ACNC: ABNORMAL [IU]/ML
ERYTHROCYTE [DISTWIDTH] IN BLOOD BY AUTOMATED COUNT: 13.2 % (ref 11.5–14.5)
EST. GFR  (AFRICAN AMERICAN): >60 ML/MIN/1.73 M^2
EST. GFR  (NON AFRICAN AMERICAN): >60 ML/MIN/1.73 M^2
GLUCOSE SERPL-MCNC: 124 MG/DL (ref 70–110)
GRAM STN SPEC: ABNORMAL
HCT VFR BLD AUTO: 36.4 % (ref 40–54)
HGB BLD-MCNC: 12.7 G/DL (ref 14–18)
M PNEUMO IGG SER IA-ACNC: POSITIVE
M PNEUMO IGM SER IA-ACNC: REACTIVE
M PNEUMO IGM SER QL: POSITIVE
MAGNESIUM SERPL-MCNC: 2.2 MG/DL (ref 1.6–2.6)
MCH RBC QN AUTO: 30.3 PG (ref 27–31)
MCHC RBC AUTO-ENTMCNC: 34.9 G/DL (ref 32–36)
MCV RBC AUTO: 87 FL (ref 82–98)
MYCOPLASMA PNEUMONIAE AB INTERPRETATION: ABNORMAL
MYELOPEROXIDASE AB SER-ACNC: 3 UNITS
P-ANCA TITR SER IF: NORMAL TITER
PHOSPHATE SERPL-MCNC: 2.2 MG/DL (ref 2.7–4.5)
PLATELET # BLD AUTO: 430 K/UL (ref 150–450)
PMV BLD AUTO: 9.6 FL (ref 9.2–12.9)
POTASSIUM SERPL-SCNC: 3.2 MMOL/L (ref 3.5–5.1)
PROT SERPL-MCNC: 6.3 G/DL (ref 6–8.4)
RBC # BLD AUTO: 4.19 M/UL (ref 4.6–6.2)
SODIUM SERPL-SCNC: 139 MMOL/L (ref 136–145)
VANCOMYCIN TROUGH SERPL-MCNC: 6.2 UG/ML (ref 10–22)
VANCOMYCIN TROUGH SERPL-MCNC: 8.8 UG/ML (ref 10–22)
WBC # BLD AUTO: 10.94 K/UL (ref 3.9–12.7)

## 2021-05-05 PROCEDURE — 25000003 PHARM REV CODE 250: Performed by: STUDENT IN AN ORGANIZED HEALTH CARE EDUCATION/TRAINING PROGRAM

## 2021-05-05 PROCEDURE — 25000003 PHARM REV CODE 250

## 2021-05-05 PROCEDURE — 36415 COLL VENOUS BLD VENIPUNCTURE: CPT

## 2021-05-05 PROCEDURE — 99231 SBSQ HOSP IP/OBS SF/LOW 25: CPT | Mod: ,,, | Performed by: INTERNAL MEDICINE

## 2021-05-05 PROCEDURE — 83735 ASSAY OF MAGNESIUM: CPT | Performed by: INTERNAL MEDICINE

## 2021-05-05 PROCEDURE — 93010 ELECTROCARDIOGRAM REPORT: CPT | Mod: ,,, | Performed by: PEDIATRICS

## 2021-05-05 PROCEDURE — 99233 SBSQ HOSP IP/OBS HIGH 50: CPT | Mod: ,,, | Performed by: HOSPITALIST

## 2021-05-05 PROCEDURE — 80053 COMPREHEN METABOLIC PANEL: CPT | Performed by: STUDENT IN AN ORGANIZED HEALTH CARE EDUCATION/TRAINING PROGRAM

## 2021-05-05 PROCEDURE — 93005 ELECTROCARDIOGRAM TRACING: CPT

## 2021-05-05 PROCEDURE — 63600175 PHARM REV CODE 636 W HCPCS: Performed by: INTERNAL MEDICINE

## 2021-05-05 PROCEDURE — 99232 SBSQ HOSP IP/OBS MODERATE 35: CPT | Mod: ,,, | Performed by: INTERNAL MEDICINE

## 2021-05-05 PROCEDURE — 25000003 PHARM REV CODE 250: Performed by: INTERNAL MEDICINE

## 2021-05-05 PROCEDURE — 80202 ASSAY OF VANCOMYCIN: CPT | Mod: 91 | Performed by: HOSPITALIST

## 2021-05-05 PROCEDURE — 63600175 PHARM REV CODE 636 W HCPCS: Performed by: STUDENT IN AN ORGANIZED HEALTH CARE EDUCATION/TRAINING PROGRAM

## 2021-05-05 PROCEDURE — 85027 COMPLETE CBC AUTOMATED: CPT | Performed by: INTERNAL MEDICINE

## 2021-05-05 PROCEDURE — 84100 ASSAY OF PHOSPHORUS: CPT | Performed by: STUDENT IN AN ORGANIZED HEALTH CARE EDUCATION/TRAINING PROGRAM

## 2021-05-05 PROCEDURE — 99231 PR SUBSEQUENT HOSPITAL CARE,LEVL I: ICD-10-PCS | Mod: ,,, | Performed by: INTERNAL MEDICINE

## 2021-05-05 PROCEDURE — 99232 PR SUBSEQUENT HOSPITAL CARE,LEVL II: ICD-10-PCS | Mod: ,,, | Performed by: INTERNAL MEDICINE

## 2021-05-05 PROCEDURE — 36415 COLL VENOUS BLD VENIPUNCTURE: CPT | Performed by: HOSPITALIST

## 2021-05-05 PROCEDURE — 80202 ASSAY OF VANCOMYCIN: CPT | Performed by: HOSPITALIST

## 2021-05-05 PROCEDURE — 11000001 HC ACUTE MED/SURG PRIVATE ROOM

## 2021-05-05 PROCEDURE — 63600175 PHARM REV CODE 636 W HCPCS: Performed by: HOSPITALIST

## 2021-05-05 PROCEDURE — 86880 COOMBS TEST DIRECT: CPT

## 2021-05-05 PROCEDURE — 93010 EKG 12-LEAD: ICD-10-PCS | Mod: ,,, | Performed by: PEDIATRICS

## 2021-05-05 PROCEDURE — 99233 PR SUBSEQUENT HOSPITAL CARE,LEVL III: ICD-10-PCS | Mod: ,,, | Performed by: HOSPITALIST

## 2021-05-05 PROCEDURE — 25000003 PHARM REV CODE 250: Performed by: HOSPITALIST

## 2021-05-05 RX ORDER — DOXYCYCLINE HYCLATE 100 MG
100 TABLET ORAL EVERY 12 HOURS
Status: DISCONTINUED | OUTPATIENT
Start: 2021-05-05 | End: 2021-05-07 | Stop reason: HOSPADM

## 2021-05-05 RX ORDER — POTASSIUM CHLORIDE 20 MEQ/1
20 TABLET, EXTENDED RELEASE ORAL
Status: COMPLETED | OUTPATIENT
Start: 2021-05-05 | End: 2021-05-05

## 2021-05-05 RX ORDER — SODIUM,POTASSIUM PHOSPHATES 280-250MG
1 POWDER IN PACKET (EA) ORAL ONCE
Status: COMPLETED | OUTPATIENT
Start: 2021-05-05 | End: 2021-05-05

## 2021-05-05 RX ORDER — IPRATROPIUM BROMIDE AND ALBUTEROL SULFATE 2.5; .5 MG/3ML; MG/3ML
SOLUTION RESPIRATORY (INHALATION)
Status: DISCONTINUED
Start: 2021-05-05 | End: 2021-05-05 | Stop reason: WASHOUT

## 2021-05-05 RX ADMIN — SUCRALFATE 1 G: 1 SUSPENSION ORAL at 09:05

## 2021-05-05 RX ADMIN — ASPIRIN 325 MG ORAL TABLET 975 MG: 325 PILL ORAL at 09:05

## 2021-05-05 RX ADMIN — VALSARTAN 20 MG: 40 TABLET, FILM COATED ORAL at 09:05

## 2021-05-05 RX ADMIN — VANCOMYCIN HYDROCHLORIDE 125 MG: KIT at 12:05

## 2021-05-05 RX ADMIN — SUCRALFATE 1 G: 1 SUSPENSION ORAL at 05:05

## 2021-05-05 RX ADMIN — LEVOFLOXACIN 750 MG: 750 TABLET, FILM COATED ORAL at 09:05

## 2021-05-05 RX ADMIN — METOPROLOL SUCCINATE 12.5 MG: 25 TABLET, EXTENDED RELEASE ORAL at 09:05

## 2021-05-05 RX ADMIN — VANCOMYCIN HYDROCHLORIDE 1000 MG: 1 INJECTION, POWDER, LYOPHILIZED, FOR SOLUTION INTRAVENOUS at 09:05

## 2021-05-05 RX ADMIN — VANCOMYCIN HYDROCHLORIDE 125 MG: KIT at 05:05

## 2021-05-05 RX ADMIN — SUCRALFATE 1 G: 1 SUSPENSION ORAL at 12:05

## 2021-05-05 RX ADMIN — PREDNISONE 80 MG: 20 TABLET ORAL at 09:05

## 2021-05-05 RX ADMIN — POTASSIUM & SODIUM PHOSPHATES POWDER PACK 280-160-250 MG 1 PACKET: 280-160-250 PACK at 09:05

## 2021-05-05 RX ADMIN — SUCRALFATE 1 G: 1 SUSPENSION ORAL at 04:05

## 2021-05-05 RX ADMIN — MUPIROCIN: 20 OINTMENT TOPICAL at 09:05

## 2021-05-05 RX ADMIN — COLCHICINE 0.6 MG: 0.6 TABLET, FILM COATED ORAL at 09:05

## 2021-05-05 RX ADMIN — VANCOMYCIN HYDROCHLORIDE 1000 MG: 1 INJECTION, POWDER, LYOPHILIZED, FOR SOLUTION INTRAVENOUS at 12:05

## 2021-05-05 RX ADMIN — POTASSIUM CHLORIDE 20 MEQ: 1500 TABLET, EXTENDED RELEASE ORAL at 09:05

## 2021-05-05 RX ADMIN — DOXYCYCLINE HYCLATE 100 MG: 100 TABLET, COATED ORAL at 02:05

## 2021-05-05 RX ADMIN — ENOXAPARIN SODIUM 40 MG: 40 INJECTION SUBCUTANEOUS at 04:05

## 2021-05-05 RX ADMIN — ACETAMINOPHEN 650 MG: 325 TABLET ORAL at 09:05

## 2021-05-05 RX ADMIN — PANTOPRAZOLE SODIUM 40 MG: 20 TABLET, DELAYED RELEASE ORAL at 09:05

## 2021-05-05 RX ADMIN — DOXYCYCLINE HYCLATE 100 MG: 100 TABLET, COATED ORAL at 09:05

## 2021-05-05 RX ADMIN — ASPIRIN 325 MG ORAL TABLET 975 MG: 325 PILL ORAL at 02:05

## 2021-05-05 RX ADMIN — ONDANSETRON 4 MG: 2 INJECTION INTRAMUSCULAR; INTRAVENOUS at 10:05

## 2021-05-05 RX ADMIN — POTASSIUM CHLORIDE 20 MEQ: 1500 TABLET, EXTENDED RELEASE ORAL at 12:05

## 2021-05-06 LAB
ALBUMIN SERPL BCP-MCNC: 2.6 G/DL (ref 3.5–5.2)
ALP SERPL-CCNC: 48 U/L (ref 55–135)
ALT SERPL W/O P-5'-P-CCNC: 61 U/L (ref 10–44)
ANION GAP SERPL CALC-SCNC: 10 MMOL/L (ref 8–16)
ASCENDING AORTA: 2.59 CM
AST SERPL-CCNC: 27 U/L (ref 10–40)
AV INDEX (PROSTH): 0.69
AV MEAN GRADIENT: 3 MMHG
AV PEAK GRADIENT: 6 MMHG
AV VALVE AREA: 3.22 CM2
AV VELOCITY RATIO: 0.79
B2 GLYCOPROT1 IGA SER QL: <9 SAU
B2 GLYCOPROT1 IGG SER QL: <9 SGU
B2 GLYCOPROT1 IGM SER QL: <9 SMU
BACTERIA BLD CULT: NORMAL
BACTERIA BLD CULT: NORMAL
BILIRUB SERPL-MCNC: 0.3 MG/DL (ref 0.1–1)
BSA FOR ECHO PROCEDURE: 1.92 M2
BUN SERPL-MCNC: 18 MG/DL (ref 6–20)
CALCIUM SERPL-MCNC: 8.3 MG/DL (ref 8.7–10.5)
CHLORIDE SERPL-SCNC: 107 MMOL/L (ref 95–110)
CO2 SERPL-SCNC: 22 MMOL/L (ref 23–29)
CREAT SERPL-MCNC: 0.9 MG/DL (ref 0.5–1.4)
CV ECHO LV RWT: 0.37 CM
DEPRECATED S PNEUM12 IGG SER-MCNC: 0.3 UG/ML
DEPRECATED S PNEUM23 IGG SER-MCNC: 6.7 UG/ML
DEPRECATED S PNEUM4 IGG SER-MCNC: 2.3 UG/ML
DEPRECATED S PNEUM8 IGG SER-MCNC: <0.3 UG/ML
DEPRECATED S PNEUM9 IGG SER-MCNC: <0.3 UG/ML
DOP CALC AO PEAK VEL: 1.18 M/S
DOP CALC AO VTI: 26.75 CM
DOP CALC LVOT AREA: 4.6 CM2
DOP CALC LVOT DIAMETER: 2.43 CM
DOP CALC LVOT PEAK VEL: 0.93 M/S
DOP CALC LVOT STROKE VOLUME: 86.08 CM3
DOP CALCLVOT PEAK VEL VTI: 18.57 CM
E WAVE DECELERATION TIME: 220.76 MSEC
E/A RATIO: 1.86
E/E' RATIO: 7.81 M/S
ECHO LV POSTERIOR WALL: 0.91 CM (ref 0.6–1.1)
EJECTION FRACTION: 55 %
ERYTHROCYTE [DISTWIDTH] IN BLOOD BY AUTOMATED COUNT: 13.2 % (ref 11.5–14.5)
EST. GFR  (AFRICAN AMERICAN): >60 ML/MIN/1.73 M^2
EST. GFR  (NON AFRICAN AMERICAN): >60 ML/MIN/1.73 M^2
FRACTIONAL SHORTENING: 20 % (ref 28–44)
GLUCOSE SERPL-MCNC: 109 MG/DL (ref 70–110)
HCT VFR BLD AUTO: 36.1 % (ref 40–54)
HGB BLD-MCNC: 12.7 G/DL (ref 14–18)
INTERVENTRICULAR SEPTUM: 0.74 CM (ref 0.6–1.1)
LA MAJOR: 4.95 CM
LA MINOR: 5.17 CM
LA WIDTH: 3.46 CM
LEFT ATRIUM SIZE: 3.83 CM
LEFT ATRIUM VOLUME INDEX MOD: 18.4 ML/M2
LEFT ATRIUM VOLUME INDEX: 30.1 ML/M2
LEFT ATRIUM VOLUME MOD: 34.8 CM3
LEFT ATRIUM VOLUME: 56.97 CM3
LEFT INTERNAL DIMENSION IN SYSTOLE: 3.9 CM (ref 2.1–4)
LEFT VENTRICLE DIASTOLIC VOLUME INDEX: 63.44 ML/M2
LEFT VENTRICLE DIASTOLIC VOLUME: 119.91 ML
LEFT VENTRICLE MASS INDEX: 72 G/M2
LEFT VENTRICLE SYSTOLIC VOLUME INDEX: 38.3 ML/M2
LEFT VENTRICLE SYSTOLIC VOLUME: 72.33 ML
LEFT VENTRICULAR INTERNAL DIMENSION IN DIASTOLE: 4.9 CM (ref 3.5–6)
LEFT VENTRICULAR MASS: 136.52 G
LV LATERAL E/E' RATIO: 6.83 M/S
LV SEPTAL E/E' RATIO: 9.11 M/S
MCH RBC QN AUTO: 30 PG (ref 27–31)
MCHC RBC AUTO-ENTMCNC: 35.2 G/DL (ref 32–36)
MCV RBC AUTO: 85 FL (ref 82–98)
MV A" WAVE DURATION": 10.66 MSEC
MV PEAK A VEL: 0.44 M/S
MV PEAK E VEL: 0.82 M/S
MV STENOSIS PRESSURE HALF TIME: 64.02 MS
MV VALVE AREA P 1/2 METHOD: 3.44 CM2
PHOSPHATE SERPL-MCNC: 2.8 MG/DL (ref 2.7–4.5)
PISA TR MAX VEL: 1.95 M/S
PLATELET # BLD AUTO: 404 K/UL (ref 150–450)
PMV BLD AUTO: 8.8 FL (ref 9.2–12.9)
POTASSIUM SERPL-SCNC: 3.6 MMOL/L (ref 3.5–5.1)
PROT SERPL-MCNC: 5.8 G/DL (ref 6–8.4)
PULM VEIN S/D RATIO: 0.92
PV PEAK D VEL: 0.59 M/S
PV PEAK S VEL: 0.54 M/S
RA MAJOR: 5.09 CM
RA PRESSURE: 3 MMHG
RA WIDTH: 3.83 CM
RBC # BLD AUTO: 4.24 M/UL (ref 4.6–6.2)
RIGHT VENTRICULAR END-DIASTOLIC DIMENSION: 4.2 CM
S PN DA SERO 19F IGG SER-MCNC: 5.5 UG/ML
S PNEUM DA 1 IGG SER-MCNC: 1.6 UG/ML
S PNEUM DA 14 IGG SER-MCNC: 5.6
S PNEUM DA 18C IGG SER-MCNC: 1.2
S PNEUM DA 3 IGG SER-MCNC: 1.4 UG/ML
S PNEUM DA 5 IGG SER-MCNC: 9.6 UG/ML
S PNEUM DA 6B IGG SER-MCNC: 34.7 UG/ML
S PNEUM DA 7F IGG SER-MCNC: 3.4 UG/ML
S PNEUM DA 9V IGG SER-MCNC: 1.5 UG/ML
SINUS: 2.93 CM
SODIUM SERPL-SCNC: 139 MMOL/L (ref 136–145)
STJ: 2.14 CM
TDI LATERAL: 0.12 M/S
TDI SEPTAL: 0.09 M/S
TDI: 0.11 M/S
TR MAX PG: 15 MMHG
TRICUSPID ANNULAR PLANE SYSTOLIC EXCURSION: 2.27 CM
TV REST PULMONARY ARTERY PRESSURE: 18 MMHG
WBC # BLD AUTO: 10.63 K/UL (ref 3.9–12.7)

## 2021-05-06 PROCEDURE — 25000003 PHARM REV CODE 250: Performed by: INTERNAL MEDICINE

## 2021-05-06 PROCEDURE — 11000001 HC ACUTE MED/SURG PRIVATE ROOM

## 2021-05-06 PROCEDURE — 93010 ELECTROCARDIOGRAM REPORT: CPT | Mod: ,,, | Performed by: PEDIATRICS

## 2021-05-06 PROCEDURE — 25000003 PHARM REV CODE 250: Performed by: STUDENT IN AN ORGANIZED HEALTH CARE EDUCATION/TRAINING PROGRAM

## 2021-05-06 PROCEDURE — 85027 COMPLETE CBC AUTOMATED: CPT | Performed by: INTERNAL MEDICINE

## 2021-05-06 PROCEDURE — 99232 PR SUBSEQUENT HOSPITAL CARE,LEVL II: ICD-10-PCS | Mod: ,,, | Performed by: INTERNAL MEDICINE

## 2021-05-06 PROCEDURE — 93010 EKG 12-LEAD: ICD-10-PCS | Mod: ,,, | Performed by: PEDIATRICS

## 2021-05-06 PROCEDURE — 84100 ASSAY OF PHOSPHORUS: CPT | Performed by: STUDENT IN AN ORGANIZED HEALTH CARE EDUCATION/TRAINING PROGRAM

## 2021-05-06 PROCEDURE — 93005 ELECTROCARDIOGRAM TRACING: CPT

## 2021-05-06 PROCEDURE — 63600175 PHARM REV CODE 636 W HCPCS: Performed by: STUDENT IN AN ORGANIZED HEALTH CARE EDUCATION/TRAINING PROGRAM

## 2021-05-06 PROCEDURE — 80053 COMPREHEN METABOLIC PANEL: CPT | Performed by: STUDENT IN AN ORGANIZED HEALTH CARE EDUCATION/TRAINING PROGRAM

## 2021-05-06 PROCEDURE — 99233 SBSQ HOSP IP/OBS HIGH 50: CPT | Mod: ,,, | Performed by: INTERNAL MEDICINE

## 2021-05-06 PROCEDURE — 63600175 PHARM REV CODE 636 W HCPCS: Performed by: INTERNAL MEDICINE

## 2021-05-06 PROCEDURE — 99233 PR SUBSEQUENT HOSPITAL CARE,LEVL III: ICD-10-PCS | Mod: ,,, | Performed by: INTERNAL MEDICINE

## 2021-05-06 PROCEDURE — 25000003 PHARM REV CODE 250: Performed by: HOSPITALIST

## 2021-05-06 PROCEDURE — 36415 COLL VENOUS BLD VENIPUNCTURE: CPT | Performed by: STUDENT IN AN ORGANIZED HEALTH CARE EDUCATION/TRAINING PROGRAM

## 2021-05-06 PROCEDURE — 99232 SBSQ HOSP IP/OBS MODERATE 35: CPT | Mod: ,,, | Performed by: INTERNAL MEDICINE

## 2021-05-06 PROCEDURE — 63600175 PHARM REV CODE 636 W HCPCS: Performed by: HOSPITALIST

## 2021-05-06 PROCEDURE — 25000003 PHARM REV CODE 250

## 2021-05-06 RX ORDER — DICYCLOMINE HYDROCHLORIDE 10 MG/1
10 CAPSULE ORAL 3 TIMES DAILY PRN
Status: DISCONTINUED | OUTPATIENT
Start: 2021-05-06 | End: 2021-05-07 | Stop reason: HOSPADM

## 2021-05-06 RX ORDER — DICYCLOMINE HYDROCHLORIDE 10 MG/1
10 CAPSULE ORAL 3 TIMES DAILY PRN
Status: DISCONTINUED | OUTPATIENT
Start: 2021-05-06 | End: 2021-05-06

## 2021-05-06 RX ADMIN — ASPIRIN 325 MG ORAL TABLET 975 MG: 325 PILL ORAL at 02:05

## 2021-05-06 RX ADMIN — VANCOMYCIN HYDROCHLORIDE 125 MG: KIT at 06:05

## 2021-05-06 RX ADMIN — DOXYCYCLINE HYCLATE 100 MG: 100 TABLET, COATED ORAL at 09:05

## 2021-05-06 RX ADMIN — PREDNISONE 80 MG: 20 TABLET ORAL at 09:05

## 2021-05-06 RX ADMIN — SUCRALFATE 1 G: 1 SUSPENSION ORAL at 09:05

## 2021-05-06 RX ADMIN — COLCHICINE 0.6 MG: 0.6 TABLET, FILM COATED ORAL at 09:05

## 2021-05-06 RX ADMIN — VALSARTAN 20 MG: 40 TABLET, FILM COATED ORAL at 09:05

## 2021-05-06 RX ADMIN — MUPIROCIN: 20 OINTMENT TOPICAL at 09:05

## 2021-05-06 RX ADMIN — VANCOMYCIN HYDROCHLORIDE 125 MG: KIT at 11:05

## 2021-05-06 RX ADMIN — METOPROLOL SUCCINATE 12.5 MG: 25 TABLET, EXTENDED RELEASE ORAL at 11:05

## 2021-05-06 RX ADMIN — SUCRALFATE 1 G: 1 SUSPENSION ORAL at 04:05

## 2021-05-06 RX ADMIN — DICYCLOMINE HYDROCHLORIDE 10 MG: 10 CAPSULE ORAL at 09:05

## 2021-05-06 RX ADMIN — ONDANSETRON 4 MG: 2 INJECTION INTRAMUSCULAR; INTRAVENOUS at 06:05

## 2021-05-06 RX ADMIN — SUCRALFATE 1 G: 1 SUSPENSION ORAL at 06:05

## 2021-05-06 RX ADMIN — LEVOFLOXACIN 750 MG: 750 TABLET, FILM COATED ORAL at 09:05

## 2021-05-06 RX ADMIN — DICYCLOMINE HYDROCHLORIDE 10 MG: 10 CAPSULE ORAL at 05:05

## 2021-05-06 RX ADMIN — ENOXAPARIN SODIUM 40 MG: 40 INJECTION SUBCUTANEOUS at 04:05

## 2021-05-06 RX ADMIN — SUCRALFATE 1 G: 1 SUSPENSION ORAL at 11:05

## 2021-05-06 RX ADMIN — PROMETHAZINE HYDROCHLORIDE 12.5 MG: 25 INJECTION INTRAMUSCULAR; INTRAVENOUS at 10:05

## 2021-05-06 RX ADMIN — PANTOPRAZOLE SODIUM 40 MG: 20 TABLET, DELAYED RELEASE ORAL at 09:05

## 2021-05-06 RX ADMIN — ONDANSETRON 4 MG: 2 INJECTION INTRAMUSCULAR; INTRAVENOUS at 04:05

## 2021-05-06 RX ADMIN — VANCOMYCIN HYDROCHLORIDE 125 MG: KIT at 12:05

## 2021-05-06 RX ADMIN — ASPIRIN 325 MG ORAL TABLET 975 MG: 325 PILL ORAL at 09:05

## 2021-05-06 RX ADMIN — ACETAMINOPHEN 650 MG: 325 TABLET ORAL at 09:05

## 2021-05-07 VITALS
BODY MASS INDEX: 28.12 KG/M2 | RESPIRATION RATE: 17 BRPM | HEART RATE: 79 BPM | TEMPERATURE: 99 F | OXYGEN SATURATION: 98 % | DIASTOLIC BLOOD PRESSURE: 64 MMHG | SYSTOLIC BLOOD PRESSURE: 118 MMHG | WEIGHT: 175 LBS | HEIGHT: 66 IN

## 2021-05-07 LAB
ALBUMIN SERPL BCP-MCNC: 2.7 G/DL (ref 3.5–5.2)
ALP SERPL-CCNC: 48 U/L (ref 55–135)
ALT SERPL W/O P-5'-P-CCNC: 66 U/L (ref 10–44)
ANION GAP SERPL CALC-SCNC: 6 MMOL/L (ref 8–16)
AST SERPL-CCNC: 24 U/L (ref 10–40)
BILIRUB SERPL-MCNC: 0.4 MG/DL (ref 0.1–1)
BUN SERPL-MCNC: 17 MG/DL (ref 6–20)
CALCIUM SERPL-MCNC: 8.5 MG/DL (ref 8.7–10.5)
CHLORIDE SERPL-SCNC: 105 MMOL/L (ref 95–110)
CO2 SERPL-SCNC: 27 MMOL/L (ref 23–29)
CREAT SERPL-MCNC: 0.9 MG/DL (ref 0.5–1.4)
ERYTHROCYTE [DISTWIDTH] IN BLOOD BY AUTOMATED COUNT: 13.1 % (ref 11.5–14.5)
EST. GFR  (AFRICAN AMERICAN): >60 ML/MIN/1.73 M^2
EST. GFR  (NON AFRICAN AMERICAN): >60 ML/MIN/1.73 M^2
GLUCOSE SERPL-MCNC: 99 MG/DL (ref 70–110)
HCT VFR BLD AUTO: 36.9 % (ref 40–54)
HGB BLD-MCNC: 13 G/DL (ref 14–18)
MCH RBC QN AUTO: 30.6 PG (ref 27–31)
MCHC RBC AUTO-ENTMCNC: 35.2 G/DL (ref 32–36)
MCV RBC AUTO: 87 FL (ref 82–98)
PHOSPHATE SERPL-MCNC: 3.4 MG/DL (ref 2.7–4.5)
PLATELET # BLD AUTO: 428 K/UL (ref 150–450)
PMV BLD AUTO: 9 FL (ref 9.2–12.9)
POTASSIUM SERPL-SCNC: 3.5 MMOL/L (ref 3.5–5.1)
PROT SERPL-MCNC: 6.1 G/DL (ref 6–8.4)
RBC # BLD AUTO: 4.25 M/UL (ref 4.6–6.2)
SODIUM SERPL-SCNC: 138 MMOL/L (ref 136–145)
WBC # BLD AUTO: 11.07 K/UL (ref 3.9–12.7)

## 2021-05-07 PROCEDURE — 93010 EKG 12-LEAD: ICD-10-PCS | Mod: ,,, | Performed by: PEDIATRICS

## 2021-05-07 PROCEDURE — 25000003 PHARM REV CODE 250: Performed by: STUDENT IN AN ORGANIZED HEALTH CARE EDUCATION/TRAINING PROGRAM

## 2021-05-07 PROCEDURE — 63600175 PHARM REV CODE 636 W HCPCS: Performed by: STUDENT IN AN ORGANIZED HEALTH CARE EDUCATION/TRAINING PROGRAM

## 2021-05-07 PROCEDURE — 93010 ELECTROCARDIOGRAM REPORT: CPT | Mod: ,,, | Performed by: PEDIATRICS

## 2021-05-07 PROCEDURE — 93005 ELECTROCARDIOGRAM TRACING: CPT

## 2021-05-07 PROCEDURE — 25000003 PHARM REV CODE 250: Performed by: INTERNAL MEDICINE

## 2021-05-07 PROCEDURE — 80053 COMPREHEN METABOLIC PANEL: CPT | Performed by: STUDENT IN AN ORGANIZED HEALTH CARE EDUCATION/TRAINING PROGRAM

## 2021-05-07 PROCEDURE — 84100 ASSAY OF PHOSPHORUS: CPT | Performed by: STUDENT IN AN ORGANIZED HEALTH CARE EDUCATION/TRAINING PROGRAM

## 2021-05-07 PROCEDURE — 36415 COLL VENOUS BLD VENIPUNCTURE: CPT | Performed by: STUDENT IN AN ORGANIZED HEALTH CARE EDUCATION/TRAINING PROGRAM

## 2021-05-07 PROCEDURE — 85027 COMPLETE CBC AUTOMATED: CPT | Performed by: INTERNAL MEDICINE

## 2021-05-07 PROCEDURE — 99239 HOSP IP/OBS DSCHRG MGMT >30: CPT | Mod: ,,, | Performed by: INTERNAL MEDICINE

## 2021-05-07 PROCEDURE — 99239 PR HOSPITAL DISCHARGE DAY,>30 MIN: ICD-10-PCS | Mod: ,,, | Performed by: INTERNAL MEDICINE

## 2021-05-07 RX ORDER — DICYCLOMINE HYDROCHLORIDE 10 MG/1
10 CAPSULE ORAL 3 TIMES DAILY PRN
Qty: 30 CAPSULE | Refills: 0 | Status: SHIPPED | OUTPATIENT
Start: 2021-05-07 | End: 2021-06-06

## 2021-05-07 RX ORDER — PREDNISONE 20 MG/1
TABLET ORAL
Qty: 140 TABLET | Refills: 0 | Status: SHIPPED | OUTPATIENT
Start: 2021-05-07 | End: 2022-01-14

## 2021-05-07 RX ORDER — SUCRALFATE 1 G/10ML
1 SUSPENSION ORAL
Qty: 400 ML | Refills: 0 | Status: SHIPPED | OUTPATIENT
Start: 2021-05-07

## 2021-05-07 RX ORDER — PROMETHAZINE HYDROCHLORIDE 12.5 MG/1
12.5 TABLET ORAL EVERY 6 HOURS PRN
Qty: 40 TABLET | Refills: 0 | Status: SHIPPED | OUTPATIENT
Start: 2021-05-07

## 2021-05-07 RX ORDER — DOXYCYCLINE HYCLATE 100 MG
100 TABLET ORAL EVERY 12 HOURS
Qty: 18 TABLET | Refills: 0 | Status: SHIPPED | OUTPATIENT
Start: 2021-05-07 | End: 2022-01-14 | Stop reason: ALTCHOICE

## 2021-05-07 RX ORDER — ASPIRIN 325 MG
975 TABLET ORAL 3 TIMES DAILY
Qty: 270 TABLET | Refills: 2 | Status: SHIPPED | OUTPATIENT
Start: 2021-05-07 | End: 2022-01-14 | Stop reason: ALTCHOICE

## 2021-05-07 RX ORDER — METOPROLOL SUCCINATE 25 MG/1
12.5 TABLET, EXTENDED RELEASE ORAL DAILY
Qty: 15 TABLET | Refills: 2 | Status: SHIPPED | OUTPATIENT
Start: 2021-05-08 | End: 2022-07-14

## 2021-05-07 RX ORDER — COLCHICINE 0.6 MG/1
0.6 TABLET ORAL 2 TIMES DAILY
Qty: 60 TABLET | Refills: 2 | Status: SHIPPED | OUTPATIENT
Start: 2021-05-07

## 2021-05-07 RX ORDER — VALSARTAN 40 MG/1
20 TABLET ORAL DAILY
Qty: 45 TABLET | Refills: 0 | Status: SHIPPED | OUTPATIENT
Start: 2021-05-08 | End: 2022-07-14

## 2021-05-07 RX ADMIN — SUCRALFATE 1 G: 1 SUSPENSION ORAL at 05:05

## 2021-05-07 RX ADMIN — DICYCLOMINE HYDROCHLORIDE 10 MG: 10 CAPSULE ORAL at 05:05

## 2021-05-07 RX ADMIN — DOXYCYCLINE HYCLATE 100 MG: 100 TABLET, COATED ORAL at 09:05

## 2021-05-07 RX ADMIN — ASPIRIN 325 MG ORAL TABLET 975 MG: 325 PILL ORAL at 02:05

## 2021-05-07 RX ADMIN — METOPROLOL SUCCINATE 12.5 MG: 25 TABLET, EXTENDED RELEASE ORAL at 09:05

## 2021-05-07 RX ADMIN — COLCHICINE 0.6 MG: 0.6 TABLET, FILM COATED ORAL at 09:05

## 2021-05-07 RX ADMIN — MUPIROCIN: 20 OINTMENT TOPICAL at 09:05

## 2021-05-07 RX ADMIN — ASPIRIN 325 MG ORAL TABLET 975 MG: 325 PILL ORAL at 09:05

## 2021-05-07 RX ADMIN — SUCRALFATE 1 G: 1 SUSPENSION ORAL at 04:05

## 2021-05-07 RX ADMIN — PREDNISONE 80 MG: 20 TABLET ORAL at 09:05

## 2021-05-07 RX ADMIN — VANCOMYCIN HYDROCHLORIDE 125 MG: KIT at 05:05

## 2021-05-07 RX ADMIN — VANCOMYCIN HYDROCHLORIDE 125 MG: KIT at 11:05

## 2021-05-07 RX ADMIN — VANCOMYCIN HYDROCHLORIDE 125 MG: KIT at 12:05

## 2021-05-07 RX ADMIN — PANTOPRAZOLE SODIUM 40 MG: 20 TABLET, DELAYED RELEASE ORAL at 09:05

## 2021-05-07 RX ADMIN — SUCRALFATE 1 G: 1 SUSPENSION ORAL at 11:05

## 2021-05-07 RX ADMIN — VALSARTAN 20 MG: 40 TABLET, FILM COATED ORAL at 09:05

## 2021-05-13 LAB — LA PPP-IMP: NORMAL

## 2021-05-31 ENCOUNTER — TELEPHONE (OUTPATIENT)
Dept: INTERNAL MEDICINE | Facility: CLINIC | Age: 21
End: 2021-05-31

## 2022-01-14 ENCOUNTER — OFFICE VISIT (OUTPATIENT)
Dept: INTERNAL MEDICINE | Facility: CLINIC | Age: 22
End: 2022-01-14
Payer: COMMERCIAL

## 2022-01-14 VITALS
BODY MASS INDEX: 34.58 KG/M2 | WEIGHT: 215.19 LBS | HEIGHT: 66 IN | OXYGEN SATURATION: 98 % | SYSTOLIC BLOOD PRESSURE: 124 MMHG | DIASTOLIC BLOOD PRESSURE: 80 MMHG | HEART RATE: 80 BPM

## 2022-01-14 DIAGNOSIS — K58.9 IRRITABLE BOWEL SYNDROME, UNSPECIFIED TYPE: ICD-10-CM

## 2022-01-14 DIAGNOSIS — G43.009 MIGRAINE WITHOUT AURA AND WITHOUT STATUS MIGRAINOSUS, NOT INTRACTABLE: ICD-10-CM

## 2022-01-14 DIAGNOSIS — I51.4: ICD-10-CM

## 2022-01-14 DIAGNOSIS — M35.1 MIXED CONNECTIVE TISSUE DISEASE: ICD-10-CM

## 2022-01-14 DIAGNOSIS — M35.00 SJOGREN SYNDROME, UNSPECIFIED: Primary | ICD-10-CM

## 2022-01-14 PROBLEM — J15.20 STAPHYLOCOCCAL PNEUMONIA: Status: RESOLVED | Noted: 2021-05-04 | Resolved: 2022-01-14

## 2022-01-14 PROBLEM — J96.01 ACUTE HYPOXEMIC RESPIRATORY FAILURE: Status: RESOLVED | Noted: 2021-05-04 | Resolved: 2022-01-14

## 2022-01-14 PROBLEM — T50.Z95A: Status: RESOLVED | Noted: 2021-05-01 | Resolved: 2022-01-14

## 2022-01-14 PROBLEM — I31.9 MYOPERICARDITIS: Status: RESOLVED | Noted: 2021-05-01 | Resolved: 2022-01-14

## 2022-01-14 PROBLEM — I50.21 ACUTE SYSTOLIC HEART FAILURE: Status: RESOLVED | Noted: 2021-05-01 | Resolved: 2022-01-14

## 2022-01-14 PROBLEM — A49.8 CLOSTRIDIUM DIFFICILE INFECTION: Status: RESOLVED | Noted: 2021-05-04 | Resolved: 2022-01-14

## 2022-01-14 PROCEDURE — 99204 PR OFFICE/OUTPT VISIT, NEW, LEVL IV, 45-59 MIN: ICD-10-PCS | Mod: S$GLB,,, | Performed by: INTERNAL MEDICINE

## 2022-01-14 PROCEDURE — 99204 OFFICE O/P NEW MOD 45 MIN: CPT | Mod: S$GLB,,, | Performed by: INTERNAL MEDICINE

## 2022-01-14 PROCEDURE — 99999 PR PBB SHADOW E&M-EST. PATIENT-LVL V: CPT | Mod: PBBFAC,,, | Performed by: INTERNAL MEDICINE

## 2022-01-14 PROCEDURE — 99999 PR PBB SHADOW E&M-EST. PATIENT-LVL V: ICD-10-PCS | Mod: PBBFAC,,, | Performed by: INTERNAL MEDICINE

## 2022-01-14 RX ORDER — DICYCLOMINE HYDROCHLORIDE 10 MG/1
10 CAPSULE ORAL
COMMUNITY

## 2022-01-14 RX ORDER — GABAPENTIN 300 MG/1
300 CAPSULE ORAL 3 TIMES DAILY
COMMUNITY
End: 2022-01-14 | Stop reason: SDUPTHER

## 2022-01-14 RX ORDER — GABAPENTIN 300 MG/1
300 CAPSULE ORAL 3 TIMES DAILY
Qty: 270 CAPSULE | Refills: 3 | Status: SHIPPED | OUTPATIENT
Start: 2022-01-14 | End: 2022-01-14 | Stop reason: CLARIF

## 2022-01-14 NOTE — PROGRESS NOTES
"    CHIEF COMPLAINT     Chief Complaint   Patient presents with    Establish Care       HPI     Kush Loyola is a 21 y.o. male  with Sjorgren's, migraine and MCTD here today for establishing primary care.    Patient is a student at [employer]. Had episode of vaccine induced mycarditis in 4/2021. Hospitalization complicated by Pneumonia and C diff.  He was treated. EF recovered and he was discharged on steroids.     Today patient is here with mom. He is off steroids. Cardiology is working to wean metop and diovan. EF has recovered. Reports gaining weight on steroids and having started losing weight after stopping steroids.     Looking for help establishing with neurology, rheumatology and PT.  Previously on amovig for migraine HA. Stopped 2/2 concern for reaction after myocarditis. Has transitioned to Botox looking for a neurologist to continue therapy down here    Personally Reviewed Patient's Medical, surgical, family and social hx. Changes updated in [facility].  Care Team updated in Epic    Review of Systems:  Review of Systems   Constitutional: Positive for fatigue.   Respiratory: Negative for cough and shortness of breath.    Cardiovascular: Negative for leg swelling.   Musculoskeletal: Positive for arthralgias.       Health Maintenance:   Reviewed with patient  Due for the following:    Will send immunization records.  PHYSICAL EXAM     /80 (BP Location: Right arm, Patient Position: Sitting)   Pulse 80   Ht 5' 6" (1.676 m)   Wt 97.6 kg (215 lb 2.7 oz)   SpO2 98%   BMI 34.73 kg/m²     Gen: Well Appearing, NAD  HEENT: PERR, EOMI  Neck: FROM, no thyromegaly, no cervical adenopathy  CVD: RRR, no M/R/G  Pulm: Normal work of breathing, CTAB, no wheezing  Abd:  Soft, NT, ND non TTP, no mass  MSK: no LE edema  Neuro: A&Ox3, gait normal, speech normal  Mood; Mood normal, behavior normal, thought process linear       LABS     Labs reviewed; Notable for  Lab Results   Component Value Date    WBC 11.07 05/07/2021    " HGB 13.0 (L) 05/07/2021    HCT 36.9 (L) 05/07/2021    MCV 87 05/07/2021     05/07/2021       CMP  Sodium   Date Value Ref Range Status   05/07/2021 138 136 - 145 mmol/L Final     Potassium   Date Value Ref Range Status   05/07/2021 3.5 3.5 - 5.1 mmol/L Final     Chloride   Date Value Ref Range Status   05/07/2021 105 95 - 110 mmol/L Final     CO2   Date Value Ref Range Status   05/07/2021 27 23 - 29 mmol/L Final     Glucose   Date Value Ref Range Status   05/07/2021 99 70 - 110 mg/dL Final     BUN   Date Value Ref Range Status   05/07/2021 17 6 - 20 mg/dL Final     Creatinine   Date Value Ref Range Status   05/07/2021 0.9 0.5 - 1.4 mg/dL Final     Calcium   Date Value Ref Range Status   05/07/2021 8.5 (L) 8.7 - 10.5 mg/dL Final     Total Protein   Date Value Ref Range Status   05/07/2021 6.1 6.0 - 8.4 g/dL Final     Albumin   Date Value Ref Range Status   05/07/2021 2.7 (L) 3.5 - 5.2 g/dL Final     Total Bilirubin   Date Value Ref Range Status   05/07/2021 0.4 0.1 - 1.0 mg/dL Final     Comment:     For infants and newborns, interpretation of results should be based  on gestational age, weight and in agreement with clinical  observations.    Premature Infant recommended reference ranges:  Up to 24 hours.............<8.0 mg/dL  Up to 48 hours............<12.0 mg/dL  3-5 days..................<15.0 mg/dL  6-29 days.................<15.0 mg/dL       Alkaline Phosphatase   Date Value Ref Range Status   05/07/2021 48 (L) 55 - 135 U/L Final     AST   Date Value Ref Range Status   05/07/2021 24 10 - 40 U/L Final     ALT   Date Value Ref Range Status   05/07/2021 66 (H) 10 - 44 U/L Final     Anion Gap   Date Value Ref Range Status   05/07/2021 6 (L) 8 - 16 mmol/L Final     eGFR if    Date Value Ref Range Status   05/07/2021 >60.0 >60 mL/min/1.73 m^2 Final     eGFR if non    Date Value Ref Range Status   05/07/2021 >60.0 >60 mL/min/1.73 m^2 Final     Comment:     Calculation used to  obtain the estimated glomerular filtration  rate (eGFR) is the CKD-EPI equation.          Echo: EF 20%--> recovered to normal    Cardiac MRI  Conclusion:    1. LV volumes are slightly enlarged. LV mass is normal. LVEF = 52 %.   2. RV volumes are normal. RVEF = 41 %.  3. Trivial pericardial effusion  4. Patchy, heterogeneous LGE is appreciated in the basal to mid inferior wall and basal anterior wall consistent with myocarditis          ASSESSMENT     1. Sjogren syndrome, unspecified     2. Mixed connective tissue disease  Ambulatory referral/consult to Physical/Occupational Therapy   3. Migraine without aura and without status migrainosus, not intractable  Ambulatory referral/consult to Neurology   4. Other chronic myocarditis  Ambulatory referral/consult to Physical/Occupational Therapy           Plan     Kush Loyola is a 21 y.o. male with sjogrens, MCTD, Migraines and episode of vaccine induced myocarditis. Looking to establish primary care.  Problem list updated, care team updated. Referred to neuro for HA management and PT for help returning to pre myocarditis form.     He has rheumatology f/u: 3/22  Neuro: referral placed  Pt; ordered        1. Sjogren syndrome, unspecified    2. Mixed connective tissue disease  - Ambulatory referral/consult to Physical/Occupational Therapy; Future    3. Migraine without aura and without status migrainosus, not intractable  - Ambulatory referral/consult to Neurology; Future    4. Other chronic myocarditis  - Ambulatory referral/consult to Physical/Occupational Therapy; Future      Farhan Khan MD

## 2022-01-24 ENCOUNTER — TELEPHONE (OUTPATIENT)
Dept: NEUROLOGY | Facility: CLINIC | Age: 22
End: 2022-01-24
Payer: COMMERCIAL

## 2022-01-24 NOTE — TELEPHONE ENCOUNTER
----- Message from Mee Camara sent at 1/14/2022  8:37 AM CST -----  Regarding: Assist with Scheduling  Wants Botox injections. Please assist with scheduling.

## 2022-02-09 ENCOUNTER — CLINICAL SUPPORT (OUTPATIENT)
Dept: REHABILITATION | Facility: HOSPITAL | Age: 22
End: 2022-02-09
Attending: INTERNAL MEDICINE
Payer: COMMERCIAL

## 2022-02-09 DIAGNOSIS — I51.4: ICD-10-CM

## 2022-02-09 DIAGNOSIS — M62.89 ABNORMAL INCREASED MUSCLE TIGHTNESS: ICD-10-CM

## 2022-02-09 DIAGNOSIS — Z74.09 IMPAIRED FUNCTIONAL MOBILITY AND ENDURANCE: ICD-10-CM

## 2022-02-09 DIAGNOSIS — M35.1 MIXED CONNECTIVE TISSUE DISEASE: ICD-10-CM

## 2022-02-09 DIAGNOSIS — R07.9 CHEST PAIN, UNSPECIFIED TYPE: Primary | ICD-10-CM

## 2022-02-09 PROCEDURE — 97163 PT EVAL HIGH COMPLEX 45 MIN: CPT | Mod: PO

## 2022-02-09 NOTE — PLAN OF CARE
[facility]  Physical Therapy Initial Evaluation  Post-COVID Recovery    Name: Kush Loyola  Clinic Number: [id number]    Therapy Diagnosis:   Encounter Diagnoses   Name Primary?    Mixed connective tissue disease     Other chronic myocarditis     Abnormal increased muscle tightness     Impaired functional mobility and endurance     Chest pain, unspecified type Yes     Physician: Farhan Khan MD    Physician Orders: PT Eval and Treat    Medical Diagnosis from Referral: M35.1 (ICD-10-CM) - Mixed connective tissue disease I51.4 (ICD-10-CM) - Other chronic myocarditis   Evaluation Date: 2/9/2022  Authorization Period Expiration: 1/14/23  Plan of Care Expiration: 4/8/22  Visit # / Visits authorized: 1/ 1    Time In: 1500  Time Out: 1553   Total Billable Time: 53 minutes    Precautions: Standard;  chronic myocarditis       Subjective   Date of onset: April 2021  History of current condition -Patient is a student at [employer]. Had episode of vaccine induced mycarditis in 4/2021. Hospitalization complicated by Pneumonia and C diff.   PMH: Sjorgren's, migraine and MCTD     Kush reports: Severity of symptoms varies day to day. Pain at chest - Tightness at chest - impaired ROM. Got back to walking Aug/Sept 21. Progressed from [facility] PT back in [place]. Wants to establish PT here now that here for school. Stairs cause SOB or extended walking.       Medical History:   Past Medical History:   Diagnosis Date    Acute systolic heart failure 5/1/2021    Autoimmune myocarditis 2016    3 episodes, 2 with elevated troponin    Connective tissue disorder     IBS (irritable bowel syndrome)     Pericarditis 2016    Staphylococcal pneumonia 5/4/2021    Vaccination side effects, initial encounter 5/1/2021       Surgical History:   Kush Loyola  has no past surgical history on file.    Medications:   Kush has a current medication list which includes the following prescription(s):  "albuterol, colchicine, dicyclomine, ibuprofen, metoprolol succinate, pantoprazole, promethazine, sertraline, sucralfate, and valsartan.    Allergies:   Review of patient's allergies indicates:   Allergen Reactions    Covid-19 vac, hermelindo(pfizer)(pf)        Prior Therapy: Yes, a lot after hospital stay in [place].  See above.   Social History: Living alone - looking for roommates.   Falls: Tendency towards ankle inversions - especially on stairs. Worse now.   DME: none    Exercise Routine / History:  Big into weight lifting prior to onset including olympic lifts. 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 - breath support feels back to normal for singing. Back to [employer] lifting 3x/week - has not started back on olympic lifting.  Was given home exercise program from [place] [facility] with stretching focus but admits he has not done it as much as he should.     Family Present at time of Eval: alone    Occupation: student at [employer].   Prior Level of Function: Independent.   Current Level of Function: limited by chest pain and limited AROM/discomfort with daily activities.     Pain: chest pain - feels very tender to touch in addition to deeper pain which he reports feels like "inflammation"  Current 5/10, worst 7/10, best 0/10   Location: sternum   Description: Aching now, occasionally sharp   Aggravating Factors: long durations of exerting   Easing Factors: rest and menthol patches, topical rubs     Pts goals: Get ROM back and improve strength and overall endurance.     Objective   Resting vitals:  BP: 127/90  HR: 85  O2 sats: 98%    Observation: pleasant and cooperative   Speech: WNL    Postural examination/scapula alignment: Rounded shoulder and Head forward, Increased lumbar lordosis    Strength: manual muscle test grades below     Upper Extremity ROM   (R) UE  (L) UE    Shoulder flexion: 160 pain 180+ max Shoulder flexion: 160 pain 180+ max   Shoulder Abduction: " 130 to pain, 150 max Shoulder abduction: 135 to pain, 155 to max   Shoulder ER WNL Shoulder ER WNL   Shoulder IR WNL Shoulder IR WNL     Upper Extremity Strength  (R) UE  (L) UE    Shoulder flexion: 5/5 Shoulder flexion: 5/5   Shoulder Abduction: 5/5 Shoulder abduction: 5/5   Elbow flexion: 5/5 Elbow flexion: 5/5   Elbow extension: 5/5 Elbow extension: 5/5   Wrist flexion: 5/5 Wrist flexion: 5/5   Wrist extension: 5/5 Wrist extension: 5/5    5/5 : 5/5     Lower Extremity Strength  Right LE  Left LE    Hip Flexion: 5/5 Hip Flexion: 5/5   Hip Extension:  5/5 Hip Extension: 5/5   Hip Adduction: 5/5 Hip Adduction: 5/5   Hip Abduction: 5/5 Hip Abduction 5/5   Knee Extension: 5/5 Knee Extension: 5/5   Knee Flexion: 5/5 Knee Flexion: 5/5   Ankle Dorsiflexion: 5/5 Ankle Dorsiflexion: 5/5   Ankle Plantarflexion: 5/5 Ankle Plantarflexion: 5/5     Ankle dorsiflexion AROM and PROM 10 deg bilateral but pain with both at end range - somewhat baseline but aggravated since incident.      Evaluation 2/9/22    30 second Chair Rise 20 completed with no arms  HR: 121  O2 96%       Gait Assessment:   - AD used: none  - Assistance: Indep  - 3 Minute Walk Test Distance: 730 feet - chest pain up to 6.5/7 out of 10.   - O2 sats after: 96  - HR after: 105 bpm  Pain at chest, right ankle and right IT band aggravated from walk.      Sitting balance static: good  Sitting balance dynamic: good  Standing balance static: good  Standing balance dynamic:  good     Endurance Deficit: Yes, moderate     PT Evaluation Completed? Yes      CMS Impairment/Limitation/Restriction for FOTO Cardiovascular Survey    Therapist reviewed FOTO scores for Kush Loyola on 2/9/2022.   FOTO documents entered into [employer] - see Media section.    Limitation Score: 40%          TREATMENT   No treatment provided today. All time spent on evaluation.     Home Exercises and Patient Education Provided    Education provided: POC, scheduling, goals of therapy    Written  Home Exercises Provided: No. To be established at initial follow up session.      Assessment   Kush is a 21 y.o. male referred to outpatient Physical Therapy with a medical diagnosis of myocarditis which initially occurred April 2021. Pt presents with ongoing chest pain with simple exertion and at rest. 6 min walk test was stopped at 3 min due to high chest pain. He is walking further distances and times between classes but pushes through the pain/discomfort. 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 He also has chronic joint instability and hypermobility which is more aggravated since the April hospital stay and is worst at the right IT band and bilateral ankles for which appropriate stretching and pain management is recommended. Skilled PT is recommended to focus on shoulder, chest and trunk ROM, endurance/activity tolerance, and joint instability/pain.     Pt prognosis is Good.   Pt will benefit from skilled outpatient Physical Therapy to address the deficits stated above and in the chart below, provide pt/family education, and to maximize pt's level of independence.     Plan of care discussed with patient: Yes  Pt's spiritual, cultural and educational needs considered and patient is agreeable to the plan of care and goals as stated below:     Anticipated Barriers for therapy: chronic nature of condition, co morbidities.     Medical Necessity is demonstrated by the following  History  Co-morbidities and personal factors that may impact the plan of care Co-morbidities: Sjorgren's, migraine and MCTD      high   Examination  Body Structures and Functions, activity limitations and participation restrictions that may impact the plan of care Body Regions:   lower extremities  upper extremities  trunk    Body Systems:    gross symmetry  ROM  gross coordinated  movement  heart rate  O2    Activity limitations:   Learning and applying knowledge  no deficits     Mobility  walking         Life Areas  school education  basic economic transactions    Community and Social Life  community life  recreation and leisure         high   Clinical Presentation unstable clinical presentation with unpredictable characteristics high   Decision Making/ Complexity Score: high     GOALS  Short term goals (STG): 4 weeks  Long Term Goals (LTG), 8  weeks.   Pt agrees to goals set. Eval date 22     Status   STG: Pt will improve bilaterally shoulder flexion AROM to at least 170 degrees, with no increase in pain, for improved motion for daily activities and tasks such as driving.   LT with no increased pain Right Shoulder flexion: 160 pain 180+ max   Left Shoulder flexion: 160 pain 180+ max      ongoing   STG:  Pt will improve bilaterally shoulder abduction AROM to at least 140 degrees, with no increase in pain, for improved motion for daily activities and tasks such as driving.   LTG: 160 deg with no increased pain Right Shoulder abduction 130 to pain, 150 to max   Left Shoulder abduction: 135 to pain, 155 to max      ongoing   STG: Patient will complete 3 min walk with pain not higher than 5/10.   LTG: Patient will tolerate 6 min walk test with pain not higher than 5/10.   3 Minute Walk Test Distance: 730 feet - chest pain up to 6.5/7 out of 10   ongoing   STG: Pt will improve FOTO limitation score to </= 35% for improved self perception of functional mobility.  LT% Limitation Score: 40%    ongoing   STG=LTG: Patient will be independent with home exercise program for ROM and endurance progression.     Old home exercise program from [facility] - patient inconsistent with doing it.  Ongoing        Plan   Plan of care Certification: 2022 to 22    Outpatient Physical Therapy 1 times weekly for 8 weeks to include the following interventions: Gait Training, Manual Therapy,  Moist Heat/ Ice, Neuromuscular Re-ed, Orthotic Management and Training, Patient Education, Self Care, Therapeutic Activities, Therapeutic Exercise and Ultrasound.     Focus:  1. ROM - shoulders, chest, trunk, hip/ IT band  2. Endurance  3. ankle strength / pain inflammation management - chronic     Karina Das, PT

## 2022-02-16 ENCOUNTER — CLINICAL SUPPORT (OUTPATIENT)
Dept: REHABILITATION | Facility: HOSPITAL | Age: 22
End: 2022-02-16
Attending: INTERNAL MEDICINE
Payer: COMMERCIAL

## 2022-02-16 DIAGNOSIS — M62.89 ABNORMAL INCREASED MUSCLE TIGHTNESS: ICD-10-CM

## 2022-02-16 DIAGNOSIS — Z74.09 IMPAIRED FUNCTIONAL MOBILITY AND ENDURANCE: ICD-10-CM

## 2022-02-16 PROCEDURE — 97110 THERAPEUTIC EXERCISES: CPT | Mod: PO

## 2022-02-16 NOTE — PROGRESS NOTES
[facility]   Physical Therapy Treatment Note     Name: Kush Loyola  Clinic Number: [id number]    Therapy Diagnosis:   Encounter Diagnoses   Name Primary?    Impaired functional mobility and endurance     Abnormal increased muscle tightness      Physician: Farhan Khan MD    Visit Date: 2/16/2022    Physician Orders: PT Eval and Treat    Medical Diagnosis from Referral: M35.1 (ICD-10-CM) - Mixed connective tissue disease I51.4 (ICD-10-CM) - Other chronic myocarditis   Evaluation Date: 2/9/2022  Authorization Period Expiration: 1/14/23  Plan of Care Expiration: 4/8/22  Visit # / Visits authorized: 1/ 1     PTA Visit #:0/5     Time In: 1525   Time Out: 1610  Total Billable Time: 45 minutes     Precautions: Standard;  chronic myocarditis       SUBJECTIVE     Pt reports: IT band was very sore yesterday - had to miss first 2 classes bc couldn't walk there.   He will be given home exercise program today.    Response to previous treatment: n/a eval only   Functional change: ongoing     Pain: 0/10  Location: denies any chest pain.       OBJECTIVE     Objective Measures updated at progress report unless specified.     Treatment     Kush received the treatments listed below:      therapeutic exercises to develop endurance, ROM and flexibility for 45 minutes including:    IT band rolling with foam roller, tennis ball and  as options for home. Provided with tennis ball and educated on options where to purchase foam roller.     Chest/pec stretching supine on 1/2 foam roll 3 x 60s     Quad stretch supine EOM 3 x 30s bilateral     Hip complex stretch at stairs 3 x 30s bilateral     Open book x 10 bilateral 10s holds    Supine Abdominal bracing with posterior pelvic tilts. 10s holds 2 x 10       Patient Education and Home Exercises     Home Exercises Provided and Patient Education Provided     Education provided: home exercise program     Written Home Exercises Provided: yes.  Exercises were reviewed and Kush was able to demonstrate them prior to the end of the session.  Kush demonstrated good  understanding of the education provided. See EMR under Patient Instructions for exercises provided during therapy sessions    ASSESSMENT     Kush did very well with development of home exercise program with excellent understanding of all moves provided today. Continue to focus on stretching, posture, and endurance.     Kush Is progressing well towards his goals.   Pt prognosis is Good.     Pt will continue to benefit from skilled outpatient physical therapy to address the deficits listed in the problem list box on initial evaluation, provide pt/family education and to maximize pt's level of independence in the home and community environment.     Pt's spiritual, cultural and educational needs considered and pt agreeable to plan of care and goals.     Anticipated barriers to physical therapy: chronic nature of condition, co morbidities.        GOALS  Short term goals (STG): 4 weeks  Long Term Goals (LTG), 8  weeks.   Pt agrees to goals set. Eval date 22       Status   STG: Pt will improve bilaterally shoulder flexion AROM to at least 170 degrees, with no increase in pain, for improved motion for daily activities and tasks such as driving.   LT with no increased pain Right Shoulder flexion: 160 pain 180+ max   Left Shoulder flexion: 160 pain 180+ max        ongoing   STG:  Pt will improve bilaterally shoulder abduction AROM to at least 140 degrees, with no increase in pain, for improved motion for daily activities and tasks such as driving.   LTG: 160 deg with no increased pain Right Shoulder abduction 130 to pain, 150 to max   Left Shoulder abduction: 135 to pain, 155 to max        ongoing   STG: Patient will complete 3 min walk with pain not higher than 5/10.   LTG: Patient will tolerate 6 min walk test with pain not higher than 5/10.   3 Minute Walk Test Distance: 730 feet  - chest pain up to 6.5/7 out of 10    ongoing   STG: Pt will improve FOTO limitation score to </= 35% for improved self perception of functional mobility.  LT% Limitation Score: 40%     ongoing   STG=LTG: Patient will be independent with home exercise program for ROM and endurance progression.     Old home exercise program from [facility] - patient inconsistent with doing it.  Ongoing          PLAN     Focus:  1. ROM - shoulders, chest, trunk, hip/ IT band  2. Endurance  3. ankle strength / pain inflammation management - chronic      Karina Das, PT

## 2022-02-16 NOTE — PATIENT INSTRUCTIONS
"Equipment: Foam roller, yoga stretching strap     Pectoral Stretch, Supine on Foam Roller    Lie on back along length of 6 inch full roller and allow arms to spread wide, keeping elbows bent 90°. To increase stretch, reach arms over head. Hold 30 seconds.  Repeat 3 times per session.     Quads / HF, Supine    Lie near edge of bed, one leg bent, foot flat on bed. Other leg hanging over edge, relaxed, thigh resting entirely on bed. Bend hanging knee backward keeping thigh in contact with bed. Hold 30 seconds.   Repeat 3 times per side per session.     Hip Abduction: With Roller    Position left hip on roller, same leg straight, arms supporting on same side. Cross other leg over thigh of lower leg, foot parallel to roller. 2-5 min Can use tennis ball option.     Flexors, Standing    Stand, one foot on chair. Use support for balance, if needed. Slowly shift weight toward raised knee until stretch is felt in quadriceps of standing leg and hamstrings of forward leg. Hold 30 seconds.   Repeat 3 times per side per session.     OPEN BOOK     Start sidelying with feet and knees together, arms outstretched, bring knees toward chest. Rotate top arm and shoulders to "open the book" , turning head with moving arm until stretch is felt. Do not let hips rotate. Hold __5-10 seconds, working up to 30s eventually__ seconds. Relax.  Repeat __10__ times per set.     [email or web address]     Posterior Pelvic Tilt      Lie with neck supported, knees bent, feet flat. Tighten and suck stomach in, pushing back down against surface. Do not push down with legs. Hold 10 seconds.   Repeat 10 times per set. Do 2 sets per session.     Copyright © I. All rights reserved.     "

## 2022-02-21 ENCOUNTER — TELEPHONE (OUTPATIENT)
Dept: INTERNAL MEDICINE | Facility: CLINIC | Age: 22
End: 2022-02-21
Payer: COMMERCIAL

## 2022-02-21 NOTE — TELEPHONE ENCOUNTER
----- Message from Rebekah Cheney sent at 2/21/2022  3:03 PM CST -----  Contact: Mother/Brie/[phone]  Pt's(Brie)  said that she is calling in regards to pt was sent home due to having a sore throat and is having trouble swallowing  , pt had a Covid test today and has not gotten the results back yet his mother wants to know if pt's test comes back negative if he can be seen in the office on tomorrow so he can have his throat checked. Please advise

## 2022-02-22 NOTE — PROGRESS NOTES
INTERNAL MEDICINE CLINIC - SAME DAY APPOINTMENT  Progress Note    PRESENTING HISTORY     PCP: Farhan Khan MD    Chief Complaint/Reason for Visit:   No chief complaint on file.      History of Present Illness & ROS : Mr. Kush Loyola is a 21 y.o. male.    Same day appt.   New to this provider. Pleasant young man.   Notations made to chart on 2/21/2022.   History of Sjogren's Syndrome.    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 Tested 'negative at [facility] on yesterday and at [facility]. Was also 'tested for strep, and flu and told was negative'. Reports that [facility] was rapid and [facility] was PCR.   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   No recent travel.   No history of streps.   Does have history of recurrent ulcers and use peroxyl for this, prescribed by his previously est'd Rheumatologist.   No fever, chills, body aches, headaches or GI symptoms.     Review of Systems:  Eyes: denies visual changes at this time denies floaters   ENT: no nasal congestion or sore throat  Respiratory: no cough or shorness of breath  Cardiovascular: no chest pain or palpitations  Gastrointestinal: no nausea or vomiting, no abdominal pain or change in bowel habits  Genitourinary: no hematuria or dysuria; denies frequency  Hematologic/Lymphatic: no easy bruising or lymphadenopathy  Musculoskeletal: no arthralgias or myalgias  Neurological: no seizures or tremors  Endocrine: no heat or cold intolerance      PAST HISTORY:     Past Medical History:   Diagnosis Date    Acute systolic heart failure 5/1/2021    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    Family  History   Problem Relation Age of Onset    Hypertension Maternal Grandfather     Heart disease Maternal Grandfather     Other Mother         iga deficiency    Aortic stenosis Sister     Stroke Sister         post op       Social History     Socioeconomic History    Marital status: Single   Occupational History    Occupation: student   Tobacco Use    Smoking status: Never Smoker    Smokeless tobacco: Never Used   Substance and Sexual Activity    Alcohol use: Yes     Comment: socially    Drug use: Never       MEDICATIONS & ALLERGIES:     Current Outpatient Medications on File Prior to Visit   Medication Sig Dispense Refill    albuterol (PROVENTIL/VENTOLIN HFA) 90 mcg/actuation inhaler Inhale 1-2 puffs into the lungs every 6 (six) hours as needed for Wheezing or Shortness of Breath. Rescue 18 g 2    colchicine (COLCRYS) 0.6 mg tablet Take 1 tablet (0.6 mg total) by mouth 2 (two) times daily. 60 tablet 2    dicyclomine (BENTYL) 10 MG capsule Take 10 mg by mouth 4 (four) times daily before meals and nightly.      ibuprofen (ADVIL,MOTRIN) 600 MG tablet Take 1 tablet (600 mg total) by mouth every 6 (six) hours as needed for Pain. 20 tablet 0    metoprolol succinate (TOPROL-XL) 25 MG 24 hr tablet Take 0.5 tablets (12.5 mg total) by mouth once daily. 15 tablet 2    pantoprazole (PROTONIX) 40 MG tablet Take 40 mg by mouth once daily.      promethazine (PHENERGAN) 12.5 MG Tab Take 1 tablet (12.5 mg total) by mouth every 6 (six) hours as needed (Nausea/Vomiting). 40 tablet 0    sertraline (ZOLOFT) 50 MG tablet Take 75 mg by mouth once daily.       sucralfate (CARAFATE) 100 mg/mL suspension Take 10 mLs (1 g total) by mouth 4 (four) times daily before meals and nightly. (Patient not taking: Reported on 1/14/2022) 400 mL 0    valsartan (DIOVAN) 40 MG tablet Take 0.5 tablets (20 mg total) by mouth once daily. 45 tablet 0     No current facility-administered medications on file prior to visit.        Review of  patient's allergies indicates:   Allergen Reactions    Covid-19 vac, hermelindo(pfizer)(pf)        Medications Reconciliation:   I have reconciled the patient's home medications with the patient/family. I have updated all changes.  Refer to After-Visit Medication List.    OBJECTIVE:     Vital Signs:  There were no vitals filed for this visit.  Wt Readings from Last 3 Encounters:   01/14/22 0801 97.6 kg (215 lb 2.7 oz)   05/01/21 0417 79.4 kg (175 lb) (78 %, Z= 0.76)*   04/30/21 1439 79.4 kg (175 lb) (78 %, Z= 0.76)*   04/30/21 0236 79.4 kg (175 lb) (78 %, Z= 0.76)*   05/01/21 0235 79.4 kg (175 lb) (78 %, Z= 0.76)*     * Growth percentiles are based on CDC (Boys, 2-20 Years) data.     There is no height or weight on file to calculate BMI.   Wt Readings from Last 3 Encounters:   02/23/22 94.1 kg (207 lb 7.3 oz)   01/14/22 97.6 kg (215 lb 2.7 oz)   05/01/21 79.4 kg (175 lb) (78 %, Z= 0.76)*     * Growth percentiles are based on CDC (Boys, 2-20 Years) data.     Temp Readings from Last 3 Encounters:   02/23/22 98 °F (36.7 °C) (Oral)   05/07/21 98.6 °F (37 °C) (Oral)   03/26/21 98.4 °F (36.9 °C)     BP Readings from Last 3 Encounters:   02/23/22 112/70   01/14/22 124/80   05/07/21 118/64     Pulse Readings from Last 3 Encounters:   02/23/22 78   01/14/22 80   05/07/21 79       Physical Exam:  (Focused Exam)  General: Well developed, well nourished. No distress.  HEENT: Head is normocephalic, atraumatic; ears are normal.   Posterior Oral Pharynx: no exudate or evidence of purulence to posterior oral pharynx, very mild erythema with area of oral pharyngeal ulcer / irritation to the right; no thrush appreciated on exam.   Eyes: Clear conjunctiva.  Neck: Supple, symmetrical neck; trachea midline.  Lungs: Clear to auscultation bilaterally and normal respiratory effort.  Cardiovascular: Heart with regular rate and rhythm. No murmurs, gallops or rubs  Extremities: No LE edema. Pulses 2+ and symmetric.   Abdomen: Abdomen is soft,  non-tender non-distended with normal bowel sounds.  Skin: Skin color, texture, turgor normal. No rashes.  Musculoskeletal: Normal gait.   Lymph Nodes: No cervical or supraclavicular adenopathy.  Neurologic: Normal strength and tone. No focal numbness or weakness.   Psychiatric: Not depressed.      Laboratory  Lab Results   Component Value Date    WBC 11.07 05/07/2021    HGB 13.0 (L) 05/07/2021    HCT 36.9 (L) 05/07/2021     05/07/2021    ALT 66 (H) 05/07/2021    AST 24 05/07/2021     05/07/2021    K 3.5 05/07/2021     05/07/2021    CREATININE 0.9 05/07/2021    BUN 17 05/07/2021    CO2 27 05/07/2021    TSH 0.564 04/30/2021       ASSESSMENT & PLAN:     Same Day appt.     PMH: Migraines, Sjogren's Syndrome, chronic Pericarditis    Pertinent to note:   Seen by Dr. 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   Referred to Neurology for chronic Migraines, consideration of Botox injections and PT for connective tissue disease.   Dr. Lui on 3/22/2022 for mgt of Sjogren syndrome (last seen in 12/2020 at [facility] by BROOKS Segundo. On Colchicine).       Notations made to chart in regards to today's appt:     Cough / Pharyngitis, unspecified etiology / Oral ulcer  *Covid tested: 2/20/2022: reportedly negative at [facility] and at [facility].   -     HETEROPHILE AB SCREEN; Future; Expected date: 02/23/2022  -     Group A Strep, Molecular  -     Strep A culture, throat  -     Influenza A & B by Molecular  -     benzonatate (TESSALON) 100 MG capsule; Take 1 capsule (100 mg total) by mouth 3 (three) times daily as needed for Cough.  Dispense: 30 capsule; Refill: 0    *Based on clinical findings or absence thereof, suspect this is of a viral syndrome. Will await the cultures and Monospot. Will treat cough with Tessalon and has been advised that with onset of fever and / or if cough should persist, would recommend going to the  ER as he is chronically immunosuppressed in the setting of SS, and would recommend getting CXR  / imagining, declines today, reasonable, no strong clinical indication to obtain at this time (clear breath sounds, nothing concerning on exam).     Reportedly Vaccinated:   Flu  COVID    Future Appointments   Date Time Provider Department Center   2/28/2022  3:30 PM Maximino Hugo, PTA [facility]   3/11/2022 11:30 AM Karina Das, PT [facility]   3/16/2022  2:00 PM Karina Das, PT [facility]   3/22/2022  9:00 AM Joaquin Lui MD [facility]   3/25/2022  9:15 AM Karina Das, PT [facility]   7/14/2022  9:00 AM Farhan Khan MD [facility]        Medication List          Accurate as of February 23, 2022 12:51 PM. If you have any questions, ask your nurse or doctor.            START taking these medications    benzonatate 100 MG capsule  Commonly known as: TESSALON  Take 1 capsule (100 mg total) by mouth 3 (three) times daily as needed for Cough.  Started by: SRIKANTH Peterson        CONTINUE taking these medications    colchicine 0.6 mg tablet  Commonly known as: COLCRYS  Take 1 tablet (0.6 mg total) by mouth 2 (two) times daily.     dicyclomine 10 MG capsule  Commonly known as: BENTYL     ibuprofen 600 MG tablet  Commonly known as: ADVIL,MOTRIN  Take 1 tablet (600 mg total) by mouth every 6 (six) hours as needed for Pain.     metoprolol succinate 25 MG 24 hr tablet  Commonly known as: TOPROL-XL  Take 0.5 tablets (12.5 mg total) by mouth once daily.     pantoprazole 40 MG tablet  Commonly known as: PROTONIX     promethazine 12.5 MG Tab  Commonly known as: PHENERGAN  Take 1 tablet (12.5 mg total) by mouth every 6 (six) hours as needed (Nausea/Vomiting).     sertraline 50 MG tablet  Commonly known as: ZOLOFT     sucralfate 100 mg/mL suspension  Commonly known as: CARAFATE  Take 10 mLs (1 g total) by mouth 4 (four) times daily before  meals and nightly.     valsartan 40 MG tablet  Commonly known as: DIOVAN  Take 0.5 tablets (20 mg total) by mouth once daily.     VENTOLIN HFA 90 mcg/actuation inhaler  Generic drug: albuterol  Inhale 1-2 puffs into the lungs every 6 (six) hours as needed for Wheezing or Shortness of Breath. Rescue           Where to Get Your Medications      These medications were sent to [facility]  [address] [place] [id number]    Hours: Mon-Fri, 8a-5:30p Phone: [phone]   · benzonatate 100 MG capsule       Signing Physician:  SRIKANTH Peterson

## 2022-02-23 ENCOUNTER — OFFICE VISIT (OUTPATIENT)
Dept: INTERNAL MEDICINE | Facility: CLINIC | Age: 22
End: 2022-02-23
Payer: COMMERCIAL

## 2022-02-23 ENCOUNTER — LAB VISIT (OUTPATIENT)
Dept: LAB | Facility: HOSPITAL | Age: 22
End: 2022-02-23
Payer: COMMERCIAL

## 2022-02-23 VITALS
BODY MASS INDEX: 33.34 KG/M2 | TEMPERATURE: 98 F | SYSTOLIC BLOOD PRESSURE: 112 MMHG | HEART RATE: 78 BPM | DIASTOLIC BLOOD PRESSURE: 70 MMHG | RESPIRATION RATE: 16 BRPM | WEIGHT: 207.44 LBS | OXYGEN SATURATION: 99 % | HEIGHT: 66 IN

## 2022-02-23 DIAGNOSIS — J02.9 PHARYNGITIS, UNSPECIFIED ETIOLOGY: ICD-10-CM

## 2022-02-23 DIAGNOSIS — K12.1 ORAL ULCER: ICD-10-CM

## 2022-02-23 DIAGNOSIS — R05.9 COUGH: Primary | ICD-10-CM

## 2022-02-23 DIAGNOSIS — R05.9 COUGH: ICD-10-CM

## 2022-02-23 LAB
GROUP A STREP, MOLECULAR: NEGATIVE
HETEROPH AB SERPL QL IA: NEGATIVE
INFLUENZA A, MOLECULAR: NEGATIVE
INFLUENZA B, MOLECULAR: NEGATIVE
SPECIMEN SOURCE: NORMAL

## 2022-02-23 PROCEDURE — 87081 CULTURE SCREEN ONLY: CPT | Performed by: NURSE PRACTITIONER

## 2022-02-23 PROCEDURE — 99214 OFFICE O/P EST MOD 30 MIN: CPT | Mod: S$GLB,,, | Performed by: NURSE PRACTITIONER

## 2022-02-23 PROCEDURE — 87502 INFLUENZA DNA AMP PROBE: CPT | Performed by: NURSE PRACTITIONER

## 2022-02-23 PROCEDURE — 86308 HETEROPHILE ANTIBODY SCREEN: CPT | Performed by: NURSE PRACTITIONER

## 2022-02-23 PROCEDURE — 36415 COLL VENOUS BLD VENIPUNCTURE: CPT | Performed by: NURSE PRACTITIONER

## 2022-02-23 PROCEDURE — 99999 PR PBB SHADOW E&M-EST. PATIENT-LVL IV: CPT | Mod: PBBFAC,,, | Performed by: NURSE PRACTITIONER

## 2022-02-23 PROCEDURE — 99214 PR OFFICE/OUTPT VISIT, EST, LEVL IV, 30-39 MIN: ICD-10-PCS | Mod: S$GLB,,, | Performed by: NURSE PRACTITIONER

## 2022-02-23 PROCEDURE — 99999 PR PBB SHADOW E&M-EST. PATIENT-LVL IV: ICD-10-PCS | Mod: PBBFAC,,, | Performed by: NURSE PRACTITIONER

## 2022-02-23 PROCEDURE — 87651 STREP A DNA AMP PROBE: CPT | Performed by: NURSE PRACTITIONER

## 2022-02-23 RX ORDER — BENZONATATE 100 MG/1
100 CAPSULE ORAL 3 TIMES DAILY PRN
Qty: 30 CAPSULE | Refills: 0 | Status: SHIPPED | OUTPATIENT
Start: 2022-02-23 | End: 2022-03-05

## 2022-02-25 ENCOUNTER — PATIENT MESSAGE (OUTPATIENT)
Dept: RHEUMATOLOGY | Facility: CLINIC | Age: 22
End: 2022-02-25
Payer: COMMERCIAL

## 2022-02-26 LAB — BACTERIA THROAT CULT: NORMAL

## 2022-03-04 ENCOUNTER — DOCUMENTATION ONLY (OUTPATIENT)
Dept: REHABILITATION | Facility: HOSPITAL | Age: 22
End: 2022-03-04
Payer: COMMERCIAL

## 2022-03-04 NOTE — PROGRESS NOTES
PT/PTA met face to face to discuss pt's treatment plan and progress towards established goals.  Continue with current PT POC with focus on stretching, range of motion, and endurance.  Patient will be seen by physical therapist at least every sixth treatment or 30 days, whichever occurs first.    Anika Martinez, PTA  03/04/2022      Face to face performed on 3/3/22

## 2022-03-09 ENCOUNTER — PATIENT MESSAGE (OUTPATIENT)
Dept: INTERNAL MEDICINE | Facility: CLINIC | Age: 22
End: 2022-03-09
Payer: COMMERCIAL

## 2022-03-11 ENCOUNTER — CLINICAL SUPPORT (OUTPATIENT)
Dept: REHABILITATION | Facility: HOSPITAL | Age: 22
End: 2022-03-11
Attending: INTERNAL MEDICINE
Payer: COMMERCIAL

## 2022-03-11 DIAGNOSIS — Z74.09 IMPAIRED FUNCTIONAL MOBILITY AND ENDURANCE: Primary | ICD-10-CM

## 2022-03-11 DIAGNOSIS — M62.89 ABNORMAL INCREASED MUSCLE TIGHTNESS: ICD-10-CM

## 2022-03-11 PROCEDURE — 97110 THERAPEUTIC EXERCISES: CPT | Mod: PO

## 2022-03-11 NOTE — PROGRESS NOTES
[facility]   Physical Therapy Treatment Note     Name: Kush Loyola  Clinic Number: [id number]    Therapy Diagnosis:   Encounter Diagnoses   Name Primary?    Impaired functional mobility and endurance Yes    Abnormal increased muscle tightness      Physician: Farhan Khan MD    Visit Date: 3/11/2022    Physician Orders: PT Eval and Treat    Medical Diagnosis from Referral: M35.1 (ICD-10-CM) - Mixed connective tissue disease I51.4 (ICD-10-CM) - Other chronic myocarditis   Evaluation Date: 2/9/2022  Authorization Period Expiration: 12/31/22  Plan of Care Expiration: 4/8/22  Visit # / Visits authorized: 2/ 20 (+eval)      PTA Visit #:0/5     Time In: 1138  Time Out: 1219  Total Billable Time: 41 minutes       Precautions: Standard;  chronic myocarditis       SUBJECTIVE     Pt reports:  Missed 2 weeks due to illness and scheduling limitations. Had MD visit yesterday - woke up with severe tonsil swelling/infalmation. On steroids to alleviate.  Feels energy is down today due to illness.   He will be given home exercise program today.    Response to previous treatment: n/a eval only   Functional change: ongoing     Pain: 5/10  Location: denies any chest pain.       OBJECTIVE     Objective Measures updated at progress report unless specified.     Treatment     Kush received the treatments listed below:      therapeutic exercises to develop endurance, ROM and flexibility for 41 minutes including:    Shoulder flexion AROM: 180 deg right 170 deg left *pain/ache   Shoulder abduction AROM: 160 deg bilateral  *pain/ache     6 min walk test: 1060 feet RPE 4/10 chest pain to 7-8/10.      Chest/pec stretching supine on 1/2 foam roll 3 x 60s - varied abduction levels each time    3 x 30s bilateral rhomboid stretches (standing holding anchor and lean away)    Seated trunk rotations with dowel at shoulders x 2 min alternating sides - paced with breathing cues   Seated trunk side bends with dowel  at shoulders x 2 min alternating sides - paced with breathing cues     Breuggers orange theraband 3 x 10 - added towel rolls at elbows for technique   At wall subscap push outs  x 10 - decreased chest pain      Patient Education and Home Exercises     Home Exercises Provided and Patient Education Provided     Education provided: home exercise program     Written Home Exercises Provided: yes. Exercises were reviewed and Kush was able to demonstrate them prior to the end of the session.  Kush demonstrated good  understanding of the education provided. See EMR under Patient Instructions for exercises provided during therapy sessions    ASSESSMENT     Kush has had some illness which prompted missed visits and had new inflammation yesterday affecting energy today. Despite this he was able to participate well. He was able to walk further and complete a 6 min walk test today - that goal updated. His AROM is improved but still uncomfortable. Continue treatment with focus on activity tolerance and trunk ROM.     Kush Is progressing well towards his goals.   Pt prognosis is Good.     Pt will continue to benefit from skilled outpatient physical therapy to address the deficits listed in the problem list box on initial evaluation, provide pt/family education and to maximize pt's level of independence in the home and community environment.     Pt's spiritual, cultural and educational needs considered and pt agreeable to plan of care and goals.     Anticipated barriers to physical therapy: chronic nature of condition, co morbidities.        GOALS  Short term goals (STG): 4 weeks  Long Term Goals (LTG), 8  weeks.   Pt agrees to goals set. Eval date 22       Status   STG: Pt will improve bilaterally shoulder flexion AROM to at least 170 degrees, with no increase in pain, for improved motion for daily activities and tasks such as driving.   LT with no increased pain Right Shoulder flexion: 160 pain 180+ max    Left Shoulder flexion: 160 pain 180+ max        ongoing   STG:  Pt will improve bilaterally shoulder abduction AROM to at least 140 degrees, with no increase in pain, for improved motion for daily activities and tasks such as driving.   LTG: 160 deg with no increased pain Right Shoulder abduction 130 to pain, 150 to max   Left Shoulder abduction: 135 to pain, 155 to max        ongoing   STG: Patient will complete 3 min walk with pain not higher than 5/10.   LTG: Patient will tolerate 6 min walk test with pain not higher than 5/10.   3 Minute Walk Test Distance: 730 feet - chest pain up to 6.5/7 out of 10    3/11/22:  6 min walk test: 1060 feet RPE 4/10 chest pain to 7-8/10.   ongoing   STG: Pt will improve FOTO limitation score to </= 35% for improved self perception of functional mobility.  LT% Limitation Score: 40%     ongoing   STG=LTG: Patient will be independent with home exercise program for ROM and endurance progression.     Old home exercise program from [facility] - patient inconsistent with doing it.  Ongoing          PLAN     Focus:  1. ROM - shoulders, chest, trunk, hip/ IT band  2. Endurance  3. ankle strength / pain inflammation management - chronic      Karina Das, PT

## 2022-03-16 ENCOUNTER — CLINICAL SUPPORT (OUTPATIENT)
Dept: REHABILITATION | Facility: HOSPITAL | Age: 22
End: 2022-03-16
Attending: INTERNAL MEDICINE
Payer: COMMERCIAL

## 2022-03-16 DIAGNOSIS — M62.89 ABNORMAL INCREASED MUSCLE TIGHTNESS: ICD-10-CM

## 2022-03-16 DIAGNOSIS — Z74.09 IMPAIRED FUNCTIONAL MOBILITY AND ENDURANCE: Primary | ICD-10-CM

## 2022-03-16 PROCEDURE — 97110 THERAPEUTIC EXERCISES: CPT | Mod: PO

## 2022-03-16 NOTE — PROGRESS NOTES
"[facility]   Physical Therapy Treatment Note     Name: Kush Loyola  Clinic Number: [id number]    Therapy Diagnosis:   Encounter Diagnoses   Name Primary?    Impaired functional mobility and endurance Yes    Abnormal increased muscle tightness      Physician: Farhan Khan MD    Visit Date: 3/16/2022    Physician Orders: PT Eval and Treat    Medical Diagnosis from Referral: M35.1 (ICD-10-CM) - Mixed connective tissue disease I51.4 (ICD-10-CM) - Other chronic myocarditis   Evaluation Date: 2/9/2022  Authorization Period Expiration: 12/31/22  Plan of Care Expiration: 4/8/22  Visit # / Visits authorized: 3 / 20 (+eval)      PTA Visit #:0/5     Time In: 1410 (10 min late to session)   Time Out: 1455  Total Billable Time: 45 minutes     Precautions: Standard;  chronic myocarditis       SUBJECTIVE     Pt reports: Chest pain about the same as last week.   He will be given home exercise program today.    Response to previous treatment: n/a eval only   Functional change: ongoing   x 2 min therapeutic rest  Pain: 4 /10  Location: denies any chest pain.       OBJECTIVE     Objective Measures updated at progress report unless specified.     Treatment     Kush received the treatments listed below:      therapeutic exercises to develop endurance, ROM and flexibility for 45 minutes including:  Recumbent bike L5 intervals (1.5:1 work/rest ratio)  x 3 min   RPE 3/10 "moderate" mBORG HR 97bpm  X 2 min therapeutic rest   X 3 min  RPE 4/10 "sort of hard" mBORG HR 107bpm  X 2 min therapeutic rest   X 3 min  RPE 5/10 " hard" mBORG HR 98bpm  X 2 min therapeutic rest   Chest pain aching 6/10     IT band / LB stretch knee across body 3 x 30s bilateral   IT band stretching straight leg with strap - dropped across body 3 x 30s     Chest/pec stretching supine on 1/2 foam roll  x 60s - varied abduction levels each time    Seated trunk rotations with dowel at shoulders x 2 min alternating sides - paced with " breathing cues   Seated trunk side bends with dowel at shoulders x 1 min alternating sides - paced with breathing cues     Breuggers green theraband 3 x 10 - added towel rolls at elbows for technique   At wall subscap push outs 3  x 10 - decreased chest pain      Patient Education and Home Exercises     Home Exercises Provided and Patient Education Provided     Education provided: home exercise program     Written Home Exercises Provided: yes. Exercises were reviewed and Kush was able to demonstrate them prior to the end of the session.  Kush demonstrated good  understanding of the education provided. See EMR under Patient Instructions for exercises provided during therapy sessions    ASSESSMENT     Kush tolerated first cardio challenges today with slight increase in chest pain. 1.5:1 work/rest ratio cued today and recommend to continue with this ratio until pain stays at baseline. HR was stable with no abnormal responses. Stretching and back strengthening with progression to green band at end of session.   Continue treatment with focus on activity tolerance and trunk ROM.     Kush Is progressing well towards his goals.   Pt prognosis is Good.     Pt will continue to benefit from skilled outpatient physical therapy to address the deficits listed in the problem list box on initial evaluation, provide pt/family education and to maximize pt's level of independence in the home and community environment.     Pt's spiritual, cultural and educational needs considered and pt agreeable to plan of care and goals.     Anticipated barriers to physical therapy: chronic nature of condition, co morbidities.        GOALS  Short term goals (STG): 4 weeks  Long Term Goals (LTG), 8  weeks.   Pt agrees to goals set. Eval date 2/9/22       Status   STG: Pt will improve bilaterally shoulder flexion AROM to at least 170 degrees, with no increase in pain, for improved motion for daily activities and tasks such as driving.    LT with no increased pain Right Shoulder flexion: 160 pain 180+ max   Left Shoulder flexion: 160 pain 180+ max        ongoing   STG:  Pt will improve bilaterally shoulder abduction AROM to at least 140 degrees, with no increase in pain, for improved motion for daily activities and tasks such as driving.   LTG: 160 deg with no increased pain Right Shoulder abduction 130 to pain, 150 to max   Left Shoulder abduction: 135 to pain, 155 to max        ongoing   STG: Patient will complete 3 min walk with pain not higher than 5/10.   LTG: Patient will tolerate 6 min walk test with pain not higher than 5/10.   3 Minute Walk Test Distance: 730 feet - chest pain up to 6.5/7 out of 10    3/11/22:  6 min walk test: 1060 feet RPE 4/10 chest pain to 7-8/10.   ongoing   STG: Pt will improve FOTO limitation score to </= 35% for improved self perception of functional mobility.  LT% Limitation Score: 40%     ongoing   STG=LTG: Patient will be independent with home exercise program for ROM and endurance progression.     Old home exercise program from [facility] - patient inconsistent with doing it.  Ongoing          PLAN     Focus:  1. ROM - shoulders, chest, trunk, hip/ IT band  2. Endurance  3. ankle strength / pain inflammation management - chronic      Karina Das, PT

## 2022-03-22 ENCOUNTER — PATIENT OUTREACH (OUTPATIENT)
Dept: ADMINISTRATIVE | Facility: OTHER | Age: 22
End: 2022-03-22
Payer: COMMERCIAL

## 2022-03-22 ENCOUNTER — HOSPITAL ENCOUNTER (OUTPATIENT)
Dept: RADIOLOGY | Facility: HOSPITAL | Age: 22
Discharge: HOME | End: 2022-03-22
Attending: INTERNAL MEDICINE
Payer: COMMERCIAL

## 2022-03-22 ENCOUNTER — OFFICE VISIT (OUTPATIENT)
Dept: RHEUMATOLOGY | Facility: CLINIC | Age: 22
End: 2022-03-22
Payer: COMMERCIAL

## 2022-03-22 VITALS
WEIGHT: 210.56 LBS | HEART RATE: 73 BPM | BODY MASS INDEX: 33.98 KG/M2 | SYSTOLIC BLOOD PRESSURE: 136 MMHG | DIASTOLIC BLOOD PRESSURE: 85 MMHG

## 2022-03-22 DIAGNOSIS — M35.9 UNDIFFERENTIATED CONNECTIVE TISSUE DISEASE: ICD-10-CM

## 2022-03-22 DIAGNOSIS — Z74.09 IMPAIRED FUNCTIONAL MOBILITY AND ENDURANCE: ICD-10-CM

## 2022-03-22 DIAGNOSIS — G43.009 MIGRAINE WITHOUT AURA AND WITHOUT STATUS MIGRAINOSUS, NOT INTRACTABLE: ICD-10-CM

## 2022-03-22 DIAGNOSIS — M35.9 UNDIFFERENTIATED CONNECTIVE TISSUE DISEASE: Primary | ICD-10-CM

## 2022-03-22 PROCEDURE — 71046 X-RAY EXAM CHEST 2 VIEWS: CPT | Mod: 26,,, | Performed by: RADIOLOGY

## 2022-03-22 PROCEDURE — 99215 OFFICE O/P EST HI 40 MIN: CPT | Mod: S$GLB,,, | Performed by: INTERNAL MEDICINE

## 2022-03-22 PROCEDURE — 99999 PR PBB SHADOW E&M-EST. PATIENT-LVL IV: ICD-10-PCS | Mod: PBBFAC,,, | Performed by: INTERNAL MEDICINE

## 2022-03-22 PROCEDURE — 99999 PR PBB SHADOW E&M-EST. PATIENT-LVL IV: CPT | Mod: PBBFAC,,, | Performed by: INTERNAL MEDICINE

## 2022-03-22 PROCEDURE — 71046 XR CHEST PA AND LATERAL: ICD-10-PCS | Mod: 26,,, | Performed by: RADIOLOGY

## 2022-03-22 PROCEDURE — 71046 X-RAY EXAM CHEST 2 VIEWS: CPT | Mod: TC,FY

## 2022-03-22 PROCEDURE — 99215 PR OFFICE/OUTPT VISIT, EST, LEVL V, 40-54 MIN: ICD-10-PCS | Mod: S$GLB,,, | Performed by: INTERNAL MEDICINE

## 2022-03-22 NOTE — PROGRESS NOTES
Health Maintenance Due   Topic Date Due    Lipid Panel  Never done    HPV Vaccines (1 - Male 2-dose series) Never done    TETANUS VACCINE  Never done    Influenza Vaccine (1) 09/01/2021    COVID-19 Vaccine (3 - Booster for Pfizer series) 09/28/2021     Updates were requested from care everywhere.  Chart was reviewed for overdue Proactive [facility] En[facility]ters ([facility]) topics (CRS, Breast Cancer Screening, Eye exam)  Health Maintenance has been updated.  LINKS immunization registry triggered.  Immunizations were reconciled.

## 2022-03-24 NOTE — PROGRESS NOTES
History of present illness:  21-year-old male who is a student at [employer].  He lives in [place].  In 2014 he had chest pain and was diagnosed as having pericarditis.  He had a positive CARISSA.  Two years later the diagnosis of lupus was made.  He was started on Plaquenil at that time.  He also has been on colchicine.  He has been treated with prednisone in the past as well.  He also has migraines an IBS.  Two days after receiving the COVID vaccine he presented to the emergency room with fever and chest pain.  This was after his 2nd dose of the vaccine.  He was felt to have myocarditis.  He was only on colchicine at the time.  Echo showed global hypokinesis.  He was diagnosed as having myocarditis.  He was treated with steroids.  He was placed back on Plaquenil.  He continued treatment with colchicine.  This was in April.  Since then he has had follow-up in [place].  These records are not available.  His last echo was in December.  He is trying to establish medical care here as well.    He still complains of some chest pain but no shortness of breath.  He has had some joint aching.  He has occasional swelling.  He had a sore throat 1 week ago.  He was seen in urgent care.  He had swollen tonsils.  He was treated with prednisone 20 mg daily for 1 week.  He had actually had another episode 2 weeks prior to that.  He complains of some sores in his throat but no palatal ulcers.    He has had no unexplained fevers.  He has a history of chronic migraines and takes gabapentin.  He also gets Botox injections.  He has occasional red spots on his hands but no other rashes.  He has no conjunctivitis.  He complains of dry eye and mouth.  He has dry skin.  He has no Raynaud's phenomena.  He has intermittent diarrhea but no bloody diarrhea.  He has no urethral discharge or ulcers.  He has occasional paresthesias in the leg with activity.  He has no thrombophlebitis.  His mother has an IgA deficiency but there is no family history  of connective tissue disease.  He has taken ibuprofen in the past with some relief.  He was on low-dose aspirin for a while but not recently.  He remains on colchicine in [facility].  He has not been using heat, ice, or topical medications.  Systems review:  General:  Weight has been stable  Physical examination:  Skin:  No rashes  ENT:  He has enlarged tonsils but no erythema.  He has no oral ulcers.  He has no conjunctivitis.  He has adequate tears and saliva.  Neck:  No adenopathy  Chest:  Has decreased breath sounds at the bases.  He has no pleural rub.  Cardiac:  No murmurs, gallops, rubs  Abdomen:  No organomegaly or masses.  No tenderness to palpation  Extremities:  No pedal edema  Musculoskeletal:  Full range of motion of all joints.  No synovitis.  No tender areas to palpation.  Laboratory:  He had negative mono spot but no other recent laboratory studies.  His CARISSA was positive 1:1280, speckled pattern with a positive SSA antibody.    Assessment:  Undifferentiated connective tissue disease, leaning somewhat warts primary Sjogren syndrome based on his laboratory studies and symptoms of dryness, although this cannot be confirmed on his physical exam.    Plans:  1. Increase Plaquenil to 400 mg daily  2. Laboratory studies obtained  3. I have also ordered a chest x-ray  4. Continue colchicine as before  5. Return in 3 months     Answers for HPI/ROS submitted by the patient on 3/22/2022  fever: No  eye redness: No  mouth sores: Yes  headaches: Yes  shortness of breath: No  chest pain: Yes  trouble swallowing: No  diarrhea: No  constipation: No  unexpected weight change: No  genital sore: No  During the last 3 days, have you had a skin rash?: No  Bruises or bleeds easily: No  cough: No

## 2022-03-25 ENCOUNTER — CLINICAL SUPPORT (OUTPATIENT)
Dept: REHABILITATION | Facility: HOSPITAL | Age: 22
End: 2022-03-25
Attending: INTERNAL MEDICINE
Payer: COMMERCIAL

## 2022-03-25 DIAGNOSIS — M62.89 ABNORMAL INCREASED MUSCLE TIGHTNESS: ICD-10-CM

## 2022-03-25 DIAGNOSIS — Z74.09 IMPAIRED FUNCTIONAL MOBILITY AND ENDURANCE: Primary | ICD-10-CM

## 2022-03-25 PROCEDURE — 97110 THERAPEUTIC EXERCISES: CPT | Mod: PO

## 2022-03-25 NOTE — PLAN OF CARE
[facility]   Physical Therapy Treatment Note     Name: Kush Loyola  Clinic Number: [id number]    Therapy Diagnosis:   Encounter Diagnoses   Name Primary?    Impaired functional mobility and endurance Yes    Abnormal increased muscle tightness      Physician: Farhan Khan MD    Visit Date: 3/25/2022    Physician Orders: PT Eval and Treat    Medical Diagnosis from Referral: M35.1 (ICD-10-CM) - Mixed connective tissue disease I51.4 (ICD-10-CM) - Other chronic myocarditis   Evaluation Date: 2/9/2022  Authorization Period Expiration: 12/31/22  Plan of Care Expiration: 5/20/22  Visit # / Visits authorized: 4 / 20 (+eval)      PTA Visit #:0/5     Time In: 0914  Time Out: 1007  Total Billable Time: 53 minutes       Precautions: Standard;  chronic myocarditis       SUBJECTIVE     Pt reports: Feels he has found a good med combo with MDs to manage pain well. 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 Feels tightness at chest and weakness at back still primary concerns.   He every other day complaint with home exercise program.  Response to previous treatment: no adverse reaction  Functional change: ongoing     Pain: 4 /10  Location:  Over right pec muscle - reported as superficial feeling    OBJECTIVE     Objective Measures updated at progress report unless specified.       CMS Impairment/Limitation/Restriction for FOTO Survey    Therapist reviewed FOTO scores for Kush Loyola on 3/25/2022.   FOTO documents entered into [employer] - see Media section.    Limitation Score: 27%            Treatment     Kush received the treatments listed below:      therapeutic exercises to develop endurance, ROM and flexibility for 53 minutes including:    GOALS  Short term goals (STG): 4 weeks  Long Term Goals (LTG), 8  weeks.   Pt agrees to goals set. Eval date 2/9/22       3/25/22 Status   STG: Pt will improve bilaterally shoulder flexion AROM to at least 170 degrees, with no increase  in pain, for improved motion for daily activities and tasks such as driving.   LT with no increased pain Right Shoulder flexion: 160 pain 180+ max   Left Shoulder flexion: 160 pain 180+ max        3/11/22:    Shoulder flexion AROM:   180 deg right   170 deg left *pain/ache  STG MET for left , LTG ongoing  STG and LTG MET right    STG:  Pt will improve bilaterally shoulder abduction AROM to at least 140 degrees, with no increase in pain, for improved motion for daily activities and tasks such as driving.   LTG: 160 deg with no increased pain Right Shoulder abduction 130 to pain, 150 to max   Left Shoulder abduction: 135 to pain, 155 to max        3/11/22:  Shoulder abduction AROM: 160 deg bilateral  *pain/ache  STG and LTG MET   STG: Patient will complete 3 min walk with pain not higher than 5/10.   LTG: Patient will tolerate 6 min walk test with pain not higher than 5/10.   3 Minute Walk Test Distance: 730 feet - chest pain up to 6.5/7 out of 10       3/11/22:  6 min walk test: 1060 feet RPE 4/10 chest pain to 7-8/10.  ongoing   STG: Pt will improve FOTO limitation score to </= 35% for improved self perception of functional mobility.  LT%  Upgrade: 20%  Limitation Score: 40%     27% STG & LTG MET  Upgrade ongoing   STG=LTG: Patient will be independent with home exercise program for ROM and endurance progression.     Old home exercise program from [facility] - patient inconsistent with doing it.  Stretching every other day.  Ongoing   STG=LTG:   Pt will improve perceived chest tightness on average for past 3 days to </= 3/10 with or without meds.  NT  Perceived chest tightness by end of day:   Without meds: 5-7/10  With meds 3-5/10    Ongoing   STG: Pt will improve manual muscle test scores for shoulder girdle muscles including Trapezius and Rhomboids to 4/5 for improved postural control and daily task performance.   LT/5 NT  Trapezius 3/5 bilateral   Rhomboids 3+/5 bilateral  Ongoing         Scifit  "recumbent stepper  L2  intervals    x 3 min   RPE 3/10 "moderate" mBORG    X 2 min therapeutic rest   X 3 min  RPE 2/10 "easier than moderate" mBORG    X 1.5 min therapeutic rest   Increased to L3  X 3 min  RPE 4/10 " sort of hard" mBORG    X 1 min therapeutic rest   Chest pain aching 5/10     Chest/pec stretching supine on 1/2 foam roll  x2 min       Breuggers green theraband 3 x 10      Prone supermans 3 x 10 - education on technique     Rows 3 x 10 green theraband   Lat pull downs 3 x 10 green theraband       Patient Education and Home Exercises     Home Exercises Provided and Patient Education Provided     Education provided: home exercise program     Written Home Exercises Provided: yes. Exercises were reviewed and Kush was able to demonstrate them prior to the end of the session.  Kush demonstrated good  understanding of the education provided. See EMR under Patient Instructions for exercises provided during therapy sessions.     ASSESSMENT     Kush did well with reassessment today showing consistent progress with his function / task tolerance as seen on FOTO score. His ROM goals have been mostly met at the shoulders and he is doing stretching regularly at home, but he has ongoing shoulder girdle weakness affecting posture and fatigue with daily tasks. He tolerated light cardio well again today with slight increase in challenge. I am recommending to extend his plan of care (POC) x 8 more weeks to continue to address postural muscle imbalances and endurance/activity tolerance. Pt is agreeable.   Kush Is progressing well towards his goals.   Pt prognosis is Good.     Pt will continue to benefit from skilled outpatient physical therapy to address the deficits listed in the problem list box on initial evaluation, provide pt/family education and to maximize pt's level of independence in the home and community environment.     Pt's spiritual, cultural and educational needs considered and pt agreeable " to plan of care and goals.     Anticipated barriers to physical therapy: chronic nature of condition, co morbidities.      GOALS  Short term goals (STG): 4 weeks  Long Term Goals (LTG), 8  weeks.   Pt agrees to goals set. Eval date 2/9/22       3/25/22 Status   STG: Pt will improve bilaterally shoulder flexion AROM to at least 170 degrees, with no increase in pain, for improved motion for daily activities and tasks such as driving.   LT with no increased pain Right Shoulder flexion: 160 pain 180+ max   Left Shoulder flexion: 160 pain 180+ max        3/11/22:    Shoulder flexion AROM:   180 deg right   170 deg left *pain/ache  STG MET for left , LTG ongoing  STG and LTG MET right    STG:  Pt will improve bilaterally shoulder abduction AROM to at least 140 degrees, with no increase in pain, for improved motion for daily activities and tasks such as driving.   LTG: 160 deg with no increased pain Right Shoulder abduction 130 to pain, 150 to max   Left Shoulder abduction: 135 to pain, 155 to max        3/11/22:  Shoulder abduction AROM: 160 deg bilateral  *pain/ache  STG and LTG MET   STG: Patient will complete 3 min walk with pain not higher than 5/10.   LTG: Patient will tolerate 6 min walk test with pain not higher than 5/10.   3 Minute Walk Test Distance: 730 feet - chest pain up to 6.5/7 out of 10       3/11/22:  6 min walk test: 1060 feet RPE 4/10 chest pain to 7-8/10.  ongoing   STG: Pt will improve FOTO limitation score to </= 35% for improved self perception of functional mobility.  LT%  Upgrade: 20%  Limitation Score: 40%     27% STG & LTG MET  Upgrade ongoing   STG=LTG: Patient will be independent with home exercise program for ROM and endurance progression.     Old home exercise program from [facility] - patient inconsistent with doing it.  Stretching every other day.  Ongoing   STG=LTG:   Pt will improve perceived chest tightness on average for past 3 days to </= 3/10 with or without meds.  NT   Perceived chest tightness by end of day:   Without meds: 5-10  With meds 3-10    Ongoing   STG: Pt will improve manual muscle test scores for shoulder girdle muscles including Trapezius and Rhomboids to 4/5 for improved postural control and daily task performance.   LT/5 NT  Trapezius 3/5 bilateral   Rhomboids 3+/5 bilateral  Ongoing         PLAN   Updated certification dates: 3/25/2022-22    Continue PT 2x weekly under established Plan of Care, with treatment to include: pt education, HEP, gait training, therapeutic exercises, neuromuscular re-education/balance exercises, therapeutic activities, and modalities PRN, to work towards established goals. Pt may be seen by PTA to carry out plan of care.     Focus:  1. ROM - shoulders, chest, trunk, hip/ IT band  2. Strengthening shoulder girdle  3. Endurance    Karina Das, PT   2022

## 2022-03-25 NOTE — PATIENT INSTRUCTIONS
Seated band pull aparts 3 x 10 - focus on shoulder blades squeezing together.       Extension: Supermans    Arms  as straight as possible, raise both simultaneously.  Do 3  sets. Complete 10  repetitions.     [email or web address] docobites.us/436     Copyright © [employer]. All rights reserved.

## 2022-03-25 NOTE — PROGRESS NOTES
[facility]   Physical Therapy Treatment Note     Name: Kush Loyola  Clinic Number: [id number]    Therapy Diagnosis:   Encounter Diagnoses   Name Primary?    Impaired functional mobility and endurance Yes    Abnormal increased muscle tightness      Physician: Farhan Khan MD    Visit Date: 3/25/2022    Physician Orders: PT Eval and Treat    Medical Diagnosis from Referral: M35.1 (ICD-10-CM) - Mixed connective tissue disease I51.4 (ICD-10-CM) - Other chronic myocarditis   Evaluation Date: 2/9/2022  Authorization Period Expiration: 12/31/22  Plan of Care Expiration: 5/20/22  Visit # / Visits authorized: 4 / 20 (+eval)      PTA Visit #:0/5     Time In: 0914  Time Out: 1007  Total Billable Time: 53 minutes       Precautions: Standard;  chronic myocarditis       SUBJECTIVE     Pt reports: Feels he has found a good med combo with MDs to manage pain well. 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 Feels tightness at chest and weakness at back still primary concerns.   He every other day complaint with home exercise program.  Response to previous treatment: no adverse reaction  Functional change: ongoing     Pain: 4 /10  Location:  Over right pec muscle - reported as superficial feeling    OBJECTIVE     Objective Measures updated at progress report unless specified.       CMS Impairment/Limitation/Restriction for FOTO Survey    Therapist reviewed FOTO scores for Kush Loyola on 3/25/2022.   FOTO documents entered into [employer] - see Media section.    Limitation Score: 27%            Treatment     Kush received the treatments listed below:      therapeutic exercises to develop endurance, ROM and flexibility for 53 minutes including:    GOALS  Short term goals (STG): 4 weeks  Long Term Goals (LTG), 8  weeks.   Pt agrees to goals set. Eval date 2/9/22       3/25/22 Status   STG: Pt will improve bilaterally shoulder flexion AROM to at least 170 degrees, with no increase  in pain, for improved motion for daily activities and tasks such as driving.   LT with no increased pain Right Shoulder flexion: 160 pain 180+ max   Left Shoulder flexion: 160 pain 180+ max        3/11/22:    Shoulder flexion AROM:   180 deg right   170 deg left *pain/ache  STG MET for left , LTG ongoing  STG and LTG MET right    STG:  Pt will improve bilaterally shoulder abduction AROM to at least 140 degrees, with no increase in pain, for improved motion for daily activities and tasks such as driving.   LTG: 160 deg with no increased pain Right Shoulder abduction 130 to pain, 150 to max   Left Shoulder abduction: 135 to pain, 155 to max        3/11/22:  Shoulder abduction AROM: 160 deg bilateral  *pain/ache  STG and LTG MET   STG: Patient will complete 3 min walk with pain not higher than 5/10.   LTG: Patient will tolerate 6 min walk test with pain not higher than 5/10.   3 Minute Walk Test Distance: 730 feet - chest pain up to 6.5/7 out of 10       3/11/22:  6 min walk test: 1060 feet RPE 4/10 chest pain to 7-8/10.  ongoing   STG: Pt will improve FOTO limitation score to </= 35% for improved self perception of functional mobility.  LT%  Upgrade: 20%  Limitation Score: 40%     27% STG & LTG MET  Upgrade ongoing   STG=LTG: Patient will be independent with home exercise program for ROM and endurance progression.     Old home exercise program from [facility] - patient inconsistent with doing it.  Stretching every other day.  Ongoing   STG=LTG:   Pt will improve perceived chest tightness on average for past 3 days to </= 3/10 with or without meds.  NT  Perceived chest tightness by end of day:   Without meds: 5-7/10  With meds 3-5/10    Ongoing   STG: Pt will improve manual muscle test scores for shoulder girdle muscles including Trapezius and Rhomboids to 4/5 for improved postural control and daily task performance.   LT/5 NT  Trapezius 3/5 bilateral   Rhomboids 3+/5 bilateral  Ongoing         Scifit  "recumbent stepper  L2  intervals    x 3 min   RPE 3/10 "moderate" mBORG    X 2 min therapeutic rest   X 3 min  RPE 2/10 "easier than moderate" mBORG    X 1.5 min therapeutic rest   Increased to L3  X 3 min  RPE 4/10 " sort of hard" mBORG    X 1 min therapeutic rest   Chest pain aching 5/10     Chest/pec stretching supine on 1/2 foam roll  x2 min       Breuggers green theraband 3 x 10      Prone supermans 3 x 10 - education on technique     Rows 3 x 10 green theraband   Lat pull downs 3 x 10 green theraband       Patient Education and Home Exercises     Home Exercises Provided and Patient Education Provided     Education provided: home exercise program     Written Home Exercises Provided: yes. Exercises were reviewed and Kush was able to demonstrate them prior to the end of the session.  Kush demonstrated good  understanding of the education provided. See EMR under Patient Instructions for exercises provided during therapy sessions.     ASSESSMENT     Kush did well with reassessment today showing consistent progress with his function / task tolerance as seen on FOTO score. His ROM goals have been mostly met at the shoulders and he is doing stretching regularly at home, but he has ongoing shoulder girdle weakness affecting posture and fatigue with daily tasks. He tolerated light cardio well again today with slight increase in challenge. I am recommending to extend his plan of care (POC) x 8 more weeks to continue to address postural muscle imbalances and endurance/activity tolerance. Pt is agreeable.   Kush Is progressing well towards his goals.   Pt prognosis is Good.     Pt will continue to benefit from skilled outpatient physical therapy to address the deficits listed in the problem list box on initial evaluation, provide pt/family education and to maximize pt's level of independence in the home and community environment.     Pt's spiritual, cultural and educational needs considered and pt agreeable " to plan of care and goals.     Anticipated barriers to physical therapy: chronic nature of condition, co morbidities.      GOALS  Short term goals (STG): 4 weeks  Long Term Goals (LTG), 8  weeks.   Pt agrees to goals set. Eval date 2/9/22       3/25/22 Status   STG: Pt will improve bilaterally shoulder flexion AROM to at least 170 degrees, with no increase in pain, for improved motion for daily activities and tasks such as driving.   LT with no increased pain Right Shoulder flexion: 160 pain 180+ max   Left Shoulder flexion: 160 pain 180+ max        3/11/22:    Shoulder flexion AROM:   180 deg right   170 deg left *pain/ache  STG MET for left , LTG ongoing  STG and LTG MET right    STG:  Pt will improve bilaterally shoulder abduction AROM to at least 140 degrees, with no increase in pain, for improved motion for daily activities and tasks such as driving.   LTG: 160 deg with no increased pain Right Shoulder abduction 130 to pain, 150 to max   Left Shoulder abduction: 135 to pain, 155 to max        3/11/22:  Shoulder abduction AROM: 160 deg bilateral  *pain/ache  STG and LTG MET   STG: Patient will complete 3 min walk with pain not higher than 5/10.   LTG: Patient will tolerate 6 min walk test with pain not higher than 5/10.   3 Minute Walk Test Distance: 730 feet - chest pain up to 6.5/7 out of 10       3/11/22:  6 min walk test: 1060 feet RPE 4/10 chest pain to 7-8/10.  ongoing   STG: Pt will improve FOTO limitation score to </= 35% for improved self perception of functional mobility.  LT%  Upgrade: 20%  Limitation Score: 40%     27% STG & LTG MET  Upgrade ongoing   STG=LTG: Patient will be independent with home exercise program for ROM and endurance progression.     Old home exercise program from [facility] - patient inconsistent with doing it.  Stretching every other day.  Ongoing   STG=LTG:   Pt will improve perceived chest tightness on average for past 3 days to </= 3/10 with or without meds.  NT   Perceived chest tightness by end of day:   Without meds: 5-10  With meds 3-10    Ongoing   STG: Pt will improve manual muscle test scores for shoulder girdle muscles including Trapezius and Rhomboids to 4/5 for improved postural control and daily task performance.   LT/5 NT  Trapezius 3/5 bilateral   Rhomboids 3+/5 bilateral  Ongoing         PLAN   Updated certification dates: 3/25/2022-22    Continue PT 2x weekly under established Plan of Care, with treatment to include: pt education, HEP, gait training, therapeutic exercises, neuromuscular re-education/balance exercises, therapeutic activities, and modalities PRN, to work towards established goals. Pt may be seen by PTA to carry out plan of care.     Focus:  1. ROM - shoulders, chest, trunk, hip/ IT band  2. Strengthening shoulder girdle  3. Endurance    Karina Das, PT   2022

## 2022-04-06 ENCOUNTER — PATIENT MESSAGE (OUTPATIENT)
Dept: RHEUMATOLOGY | Facility: CLINIC | Age: 22
End: 2022-04-06
Payer: COMMERCIAL

## 2022-04-06 RX ORDER — HYDROXYCHLOROQUINE SULFATE 200 MG/1
TABLET, FILM COATED ORAL
COMMUNITY
Start: 2022-01-24 | End: 2022-04-06 | Stop reason: SDUPTHER

## 2022-04-07 ENCOUNTER — DOCUMENTATION ONLY (OUTPATIENT)
Dept: REHABILITATION | Facility: HOSPITAL | Age: 22
End: 2022-04-07
Payer: COMMERCIAL

## 2022-04-07 RX ORDER — HYDROXYCHLOROQUINE SULFATE 200 MG/1
400 TABLET, FILM COATED ORAL DAILY
Qty: 60 TABLET | Refills: 3 | Status: SHIPPED | OUTPATIENT
Start: 2022-04-07 | End: 2022-05-07

## 2022-04-07 NOTE — PROGRESS NOTES
PT/PTA met face to face to discuss pt's treatment plan and progress towards established goals.  Continue with current PT POC with focus on stretching and endurance.  Patient will be seen by physical therapist at least every sixth treatment or 30 days, whichever occurs first.    Anika Martinez, PTA  04/07/2022

## 2022-04-08 ENCOUNTER — CLINICAL SUPPORT (OUTPATIENT)
Dept: REHABILITATION | Facility: HOSPITAL | Age: 22
End: 2022-04-08
Payer: COMMERCIAL

## 2022-04-08 DIAGNOSIS — M62.89 ABNORMAL INCREASED MUSCLE TIGHTNESS: ICD-10-CM

## 2022-04-08 DIAGNOSIS — Z74.09 IMPAIRED FUNCTIONAL MOBILITY AND ENDURANCE: Primary | ICD-10-CM

## 2022-04-08 PROCEDURE — 97110 THERAPEUTIC EXERCISES: CPT | Mod: PO,CQ

## 2022-04-08 NOTE — PROGRESS NOTES
"[facility]   Physical Therapy Treatment Note     Name: Kush Loyola  Clinic Number: [id number]    Therapy Diagnosis:   Encounter Diagnoses   Name Primary?   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  Response to previous treatment: no adverse reaction  Functional change: on going     Pain: 6 /10, 4-5/10   Location:  Headache, chest pain    OBJECTIVE     Objective Measures updated at progress report unless specified.     Treatment     Kush received the treatments listed below:      therapeutic exercises to develop endurance, ROM and flexibility for 53 minutes including:    SP02- 97%, 85- HR    X 10 min Sci Fit recumbent stepper.  B UE/B LE on level 2.0. multi peaks.   Chest pain 6/10    Chest/pec stretching supine on 1/2 foam roll  x2 min     X 10 reps of snow angels while on 1/2foam roll     Breuggers green theraband 3 x 10      Prone supermans 3 x 10 - education on technique     Rows 3 x 10 green theraband   Lat pull downs 3 x 10 green theraband       Patient Education and Home Exercises     Home Exercises Provided and Patient Education Provided     Education provided: home exercise program     Written Home Exercises Provided: yes. Exercises were reviewed and Kush was able to " demonstrate them prior to the end of the session.  Kush demonstrated good  understanding of the education provided. See EMR under Patient Instructions for exercises provided during therapy sessions.     ASSESSMENT     Kush tolerated his tx session well and did not have any problems noted.  Kush was able to perform a full 10 mins of cardio without any increase of pain in his chest and cont with pec stretches and B UE strengthening exercises.  Cont with plan of care.   Kush Is progressing well towards his goals.   Pt prognosis is Good.     Pt will continue to benefit from skilled outpatient physical therapy to address the deficits listed in the problem list box on initial evaluation, provide pt/family education and to maximize pt's level of independence in the home and community environment.     Pt's spiritual, cultural and educational needs considered and pt agreeable to plan of care and goals.     Anticipated barriers to physical therapy: chronic nature of condition, co morbidities.      GOALS  Short term goals (STG): 4 weeks  Long Term Goals (LTG), 8  weeks.   Pt agrees to goals set. Eval date 2/9/22       3/25/22 Status   STG: Pt will improve bilaterally shoulder flexion AROM to at least 170 degrees, with no increase in pain, for improved motion for daily activities and tasks such as driving.   LT with no increased pain Right Shoulder flexion: 160 pain 180+ max   Left Shoulder flexion: 160 pain 180+ max        3/11/22:    Shoulder flexion AROM:   180 deg right   170 deg left *pain/ache  STG MET for left , LTG ongoing  STG and LTG MET right    STG:  Pt will improve bilaterally shoulder abduction AROM to at least 140 degrees, with no increase in pain, for improved motion for daily activities and tasks such as driving.   LTG: 160 deg with no increased pain Right Shoulder abduction 130 to pain, 150 to max   Left Shoulder abduction: 135 to pain, 155 to max        3/11/22:  Shoulder abduction AROM:  160 deg bilateral  *pain/ache  STG and LTG MET   STG: Patient will complete 3 min walk with pain not higher than 5/10.   LTG: Patient will tolerate 6 min walk test with pain not higher than 5/10.   3 Minute Walk Test Distance: 730 feet - chest pain up to 6.5/7 out of 10       3/11/22:  6 min walk test: 1060 feet RPE 4/10 chest pain to 7-8/10.  ongoing   STG: Pt will improve FOTO limitation score to </= 35% for improved self perception of functional mobility.  LT%  Upgrade: 20%  Limitation Score: 40%     27% STG & LTG MET  Upgrade ongoing   STG=LTG: Patient will be independent with home exercise program for ROM and endurance progression.     Old home exercise program from [facility] - patient inconsistent with doing it.  Stretching every other day.  Ongoing   STG=LTG:   Pt will improve perceived chest tightness on average for past 3 days to </= 3/10 with or without meds.  NT  Perceived chest tightness by end of day:   Without meds: 5-7/10  With meds 3-5/10    Ongoing   STG: Pt will improve manual muscle test scores for shoulder girdle muscles including Trapezius and Rhomboids to 4/5 for improved postural control and daily task performance.   LT/5 NT  Trapezius 3/5 bilateral   Rhomboids 3+/5 bilateral  Ongoing         PLAN   Updated certification dates: 2022-22    Continue PT 2x weekly under established Plan of Care, with treatment to include: pt education, HEP, gait training, therapeutic exercises, neuromuscular re-education/balance exercises, therapeutic activities, and modalities PRN, to work towards established goals. Pt may be seen by PTA to carry out plan of care.     Focus:  1. ROM - shoulders, chest, trunk, hip/ IT band  2. Strengthening shoulder girdle  3. Endurance    Anika Martinez, PTA   2022

## 2022-05-10 ENCOUNTER — PATIENT MESSAGE (OUTPATIENT)
Dept: INTERNAL MEDICINE | Facility: CLINIC | Age: 22
End: 2022-05-10
Payer: COMMERCIAL

## 2022-05-10 RX ORDER — SERTRALINE HYDROCHLORIDE 50 MG/1
75 TABLET, FILM COATED ORAL DAILY
Qty: 90 TABLET | Refills: 3 | Status: SHIPPED | OUTPATIENT
Start: 2022-05-10 | End: 2022-07-14 | Stop reason: SDUPTHER

## 2022-05-10 NOTE — TELEPHONE ENCOUNTER
Refill Routing Note   Medication(s) are not appropriate for processing by [facility] for the following reason(s):      - Medication not previously prescribed by PCP    [facility] action(s):  Defer          Medication reconciliation completed: No     Appointments  past 12m or future 3m with PCP    Date Provider   Last Visit   1/14/2022 Farhan Khan MD   Next Visit   7/14/2022 Farhan Khan MD   ED visits in past 90 days: 0        Note composed:3:54 PM 05/10/2022

## 2022-05-10 NOTE — TELEPHONE ENCOUNTER
No new care gaps identified.  [facility] Embedded Care Gaps. Reference number: [id number]. 5/10/2022   12:16:23 PM CDT

## 2022-05-13 ENCOUNTER — DOCUMENTATION ONLY (OUTPATIENT)
Dept: REHABILITATION | Facility: HOSPITAL | Age: 22
End: 2022-05-13
Payer: COMMERCIAL

## 2022-05-13 NOTE — PROGRESS NOTES
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   Kush Loyola presented for 5 follow up visits after the evaluation and then cancelled/no-showed the remaining PT visits scheduled. The patient did not schedule any further sessions. Due to such limited PT attendance and unexpected pt self discharge, no progress noted and no final discharge measures were assessed.     Discharge reason : Pt has not re-scheduled further follow-up sessions    PLAN   This patient is discharged from Outpatient Physical Therapy Services.     Karina Das, PT  05/13/2022

## 2022-07-06 ENCOUNTER — OFFICE VISIT (OUTPATIENT)
Dept: RHEUMATOLOGY | Facility: CLINIC | Age: 22
End: 2022-07-06
Payer: COMMERCIAL

## 2022-07-06 VITALS
HEART RATE: 99 BPM | DIASTOLIC BLOOD PRESSURE: 77 MMHG | SYSTOLIC BLOOD PRESSURE: 121 MMHG | WEIGHT: 212.06 LBS | BODY MASS INDEX: 34.08 KG/M2 | HEIGHT: 66 IN

## 2022-07-06 DIAGNOSIS — M35.00 SJOGREN SYNDROME, UNSPECIFIED: Primary | ICD-10-CM

## 2022-07-06 DIAGNOSIS — M79.10 MYALGIA: ICD-10-CM

## 2022-07-06 PROCEDURE — 99999 PR PBB SHADOW E&M-EST. PATIENT-LVL IV: CPT | Mod: PBBFAC,,, | Performed by: INTERNAL MEDICINE

## 2022-07-06 PROCEDURE — 99999 PR PBB SHADOW E&M-EST. PATIENT-LVL IV: ICD-10-PCS | Mod: PBBFAC,,, | Performed by: INTERNAL MEDICINE

## 2022-07-06 PROCEDURE — 99213 PR OFFICE/OUTPT VISIT, EST, LEVL III, 20-29 MIN: ICD-10-PCS | Mod: S$GLB,,, | Performed by: INTERNAL MEDICINE

## 2022-07-06 PROCEDURE — 99213 OFFICE O/P EST LOW 20 MIN: CPT | Mod: S$GLB,,, | Performed by: INTERNAL MEDICINE

## 2022-07-06 RX ORDER — RIZATRIPTAN BENZOATE 10 MG/1
TABLET ORAL
COMMUNITY
Start: 2022-06-15

## 2022-07-06 RX ORDER — HYDROXYCHLOROQUINE SULFATE 200 MG/1
400 TABLET, FILM COATED ORAL DAILY
COMMUNITY
Start: 2022-06-06 | End: 2022-08-14

## 2022-07-06 RX ORDER — GABAPENTIN 300 MG/1
CAPSULE ORAL
COMMUNITY
Start: 2022-06-15

## 2022-07-06 RX ORDER — SODIUM SULFACETAMIDE AND SULFUR 80; 40 MG/ML; MG/ML
SOLUTION TOPICAL
COMMUNITY
Start: 2022-05-31

## 2022-07-06 RX ORDER — CLINDAMYCIN PHOSPHATE 10 UG/ML
LOTION TOPICAL
COMMUNITY
Start: 2022-05-31

## 2022-07-06 RX ORDER — CYCLOBENZAPRINE HCL 5 MG
5 TABLET ORAL NIGHTLY PRN
COMMUNITY
Start: 2022-05-25

## 2022-07-06 RX ORDER — MONTELUKAST SODIUM 10 MG/1
10 TABLET ORAL DAILY
COMMUNITY
Start: 2022-05-09 | End: 2022-10-05 | Stop reason: SDUPTHER

## 2022-07-06 RX ORDER — TRETINOIN 0.5 MG/G
1 CREAM TOPICAL NIGHTLY
COMMUNITY
Start: 2022-05-31

## 2022-07-06 NOTE — PROGRESS NOTES
Answers for HPI/ROS submitted by the patient on 7/6/2022  fever: No  eye redness: No  mouth sores: Yes  headaches: Yes  shortness of breath: No  chest pain: Yes  trouble swallowing: No  diarrhea: Yes  constipation: Yes  unexpected weight change: No  genital sore: No  During the last 3 days, have you had a skin rash?: No  Bruises or bleeds easily: No  cough: No

## 2022-07-08 NOTE — PROGRESS NOTES
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.  He lives in [place].  In 2014 he had chest pain and was diagnosed as having pericarditis.  He had a positive CARISSA.  Two years later the diagnosis of lupus was made.  He was started on Plaquenil at that time.  He also has been on colchicine.  He has been treated with prednisone in the past as well.  He also has migraines and IBS.  Two days after receiving the COVID vaccine he presented to the emergency room with fever and chest pain.  This was after his 2nd dose of the vaccine.  He was felt to have myocarditis.  He was only on colchicine at the time.  Echo showed global hypokinesis.  He was diagnosed as having myocarditis.  He was treated with steroids.  He was placed back on Plaquenil.  He continued treatment with colchicine.    I saw him initially in March.  He was asymptomatic at the time except for some joint aching.  He had a negative physical exam.  He had a positive CARISSA and positive SSA antibody.  I wondered if he actually had primary Sjogren syndrome instead of a undifferentiated connective tissue disease.  I may no change in his medications.    He was evaluated in [place] 2 weeks ago.  His Plaquenil was decreased to 300 mg daily.  He has been having some joint pain, especially his hips.  He has been exercising.  He went to therapy several times.  He has been taking Tylenol as needed.  He also takes Flexeril as needed.  He is on gabapentin 300 mg twice daily.  He remains on colchicine.  He has some chest pain with exercise.  He had Botox injection for migraine.    Physical examination:  Musculoskeletal:  He has good range of motion of all joints.  He has no tender areas to palpation.    Assessment:  No evidence of active disease    Plans:  He plans on returning to [place] in December.  I defer follow-up to them.  I would be happy to see him while he is here if he has an exacerbation.  Answers for HPI/ROS submitted by the patient  on 7/6/2022  fever: No  eye redness: No  mouth sores: Yes  headaches: Yes  shortness of breath: No  chest pain: Yes  trouble swallowing: No  diarrhea: Yes  constipation: Yes  unexpected weight change: No  genital sore: No  During the last 3 days, have you had a skin rash?: No  Bruises or bleeds easily: No  cough: No

## 2022-07-14 ENCOUNTER — OFFICE VISIT (OUTPATIENT)
Dept: INTERNAL MEDICINE | Facility: CLINIC | Age: 22
End: 2022-07-14
Payer: COMMERCIAL

## 2022-07-14 VITALS
HEIGHT: 66 IN | DIASTOLIC BLOOD PRESSURE: 88 MMHG | BODY MASS INDEX: 32.95 KG/M2 | OXYGEN SATURATION: 98 % | WEIGHT: 205 LBS | SYSTOLIC BLOOD PRESSURE: 116 MMHG | HEART RATE: 68 BPM

## 2022-07-14 DIAGNOSIS — K58.9 IRRITABLE BOWEL SYNDROME, UNSPECIFIED TYPE: ICD-10-CM

## 2022-07-14 DIAGNOSIS — G43.009 MIGRAINE WITHOUT AURA AND WITHOUT STATUS MIGRAINOSUS, NOT INTRACTABLE: ICD-10-CM

## 2022-07-14 DIAGNOSIS — F41.9 ANXIETY: Primary | ICD-10-CM

## 2022-07-14 DIAGNOSIS — M35.00 SJOGREN SYNDROME, UNSPECIFIED: ICD-10-CM

## 2022-07-14 PROCEDURE — 99214 OFFICE O/P EST MOD 30 MIN: CPT | Mod: S$GLB,,, | Performed by: INTERNAL MEDICINE

## 2022-07-14 PROCEDURE — 99214 PR OFFICE/OUTPT VISIT, EST, LEVL IV, 30-39 MIN: ICD-10-PCS | Mod: S$GLB,,, | Performed by: INTERNAL MEDICINE

## 2022-07-14 PROCEDURE — 99999 PR PBB SHADOW E&M-EST. PATIENT-LVL IV: CPT | Mod: PBBFAC,,, | Performed by: INTERNAL MEDICINE

## 2022-07-14 PROCEDURE — 99999 PR PBB SHADOW E&M-EST. PATIENT-LVL IV: ICD-10-PCS | Mod: PBBFAC,,, | Performed by: INTERNAL MEDICINE

## 2022-07-14 RX ORDER — SERTRALINE HYDROCHLORIDE 100 MG/1
100 TABLET, FILM COATED ORAL DAILY
Qty: 90 TABLET | Refills: 3 | Status: SHIPPED | OUTPATIENT
Start: 2022-07-14 | End: 2023-09-23 | Stop reason: SDUPTHER

## 2022-07-14 NOTE — PROGRESS NOTES
"    CHIEF COMPLAINT     Chief Complaint   Patient presents with    Follow-up       HPI     Kush Loyola is a 21 y.o. male Sjogren syndrome history of myocarditis, IBS anxiety and migraine headaches here today for six-month follow-up    Patient was home in [place] in May was seen by his cardiologist rheumatologist ophthalmologist and gastroenterologist.  His Plaquenil is decreased 300 mg. Eye doctor and cardiologist no changes    Reports that his felt increased anxiety over the past few months.  He has been taking sertraline 75 did not notice a big difference between 50 and 75.  Feels like things that are minor have bigger impact is.  Reports he has been exercising which helps.  Reports sleep is often on but does not feel like that is driving his issue.  Reports that he is taking summer school this summer..  Reports he was previously in therapy which was helpful is not been in therapy down here.    Personally Reviewed Patient's Medical, surgical, family and social hx. Changes updated in [employer].  Care Team updated in Epic    Review of Systems:  Review of Systems   Musculoskeletal:        Ankle and IT bands   Psychiatric/Behavioral: The patient is nervous/anxious.        Health Maintenance:   Reviewed with patient  Due for the following:      PHYSICAL EXAM     /88 (BP Location: Right arm, Patient Position: Sitting, BP Method: Large (Manual))   Pulse 68   Ht 5' 6" (1.676 m)   Wt 93 kg (205 lb)   SpO2 98%   BMI 33.09 kg/m²     Gen: Well Appearing, NAD  HEENT: PERR, EOMI  Neck: FROM, no thyromegaly, no cervical adenopathy  CVD: RRR, no M/R/G  Pulm: Normal work of breathing, CTAB, no wheezing  Abd:  Soft, NT, ND non TTP, no mass  MSK: no LE edema  Neuro: A&Ox3, gait normal, speech normal  Mood; Mood normal, behavior normal, thought process linear       LABS     Labs reviewed; Notable for  .5/25: CBC nl  CMP NL  ASSESSMENT     1. Anxiety  sertraline (ZOLOFT) 100 MG tablet   2. Irritable bowel syndrome, " unspecified type     3. Sjogren syndrome, unspecified     4. Migraine without aura and without status migrainosus, not intractable             Plan     Kush Loyola is a 21 y.o. male with anxiety, IBS, migraine,     1. Anxiety  Discussed multimodal tx options including lifestyle optimization, talk therapy and pharmacotherapy.  Lifestyle: increase fruit and vegetable intake, improve sleep hygiene with goal  8 hours of sleep and increase physical activity with goal of 150 minutes weekly   Talk therapy: previous effective  Medication: trial increase sertraline to 100mg   - sertraline (ZOLOFT) 100 MG tablet; Take 1 tablet (100 mg total) by mouth once daily.  Dispense: 90 tablet; Refill: 3    2. Irritable bowel syndrome, unspecified type  Continue bentyl  avoid    3. Sjogren syndrome, unspecified  Continue with plaquenil  No new dental caries    4. Migraine without aura and without status migrainosus, not intractable  Continue with Botox.    Farhan Khan MD

## 2022-10-04 ENCOUNTER — PATIENT MESSAGE (OUTPATIENT)
Dept: INTERNAL MEDICINE | Facility: CLINIC | Age: 22
End: 2022-10-04
Payer: COMMERCIAL

## 2022-10-05 RX ORDER — MONTELUKAST SODIUM 10 MG/1
10 TABLET ORAL DAILY
Qty: 30 TABLET | Refills: 11 | Status: SHIPPED | OUTPATIENT
Start: 2022-10-05 | End: 2023-12-06 | Stop reason: SDUPTHER

## 2022-10-05 NOTE — TELEPHONE ENCOUNTER
No new care gaps identified.  [facility] Embedded Care Gaps. Reference number: [id number]. 10/05/2022   9:28:23 AM MACKENZIET

## 2023-01-17 ENCOUNTER — PATIENT MESSAGE (OUTPATIENT)
Dept: RHEUMATOLOGY | Facility: CLINIC | Age: 23
End: 2023-01-17
Payer: COMMERCIAL

## 2023-02-01 ENCOUNTER — TELEPHONE (OUTPATIENT)
Dept: INTERNAL MEDICINE | Facility: CLINIC | Age: 23
End: 2023-02-01
Payer: COMMERCIAL

## 2023-02-01 DIAGNOSIS — U07.1 COVID-19 VIRUS INFECTION: Primary | ICD-10-CM

## 2023-02-01 NOTE — TELEPHONE ENCOUNTER
Called pt and relayed message that the Paxlovid was sent to the pharmacy on file. Pt verbalized understanding.

## 2023-02-01 NOTE — TELEPHONE ENCOUNTER
"Pt states he started with sx on 1/30, tested positive for covid on 1/31. Pt c/o yellow productive cough, nasal congestion, body aches, HA and "low grade fever". Pt denies CP and dyspnea. Pt requesting Paxlovid. Reviewed isolation protocol and symptomatic treatment with pt. Also review emergency sx to report to ED.   Covid risk 3  03/22 eGFR >60      "

## 2023-02-01 NOTE — TELEPHONE ENCOUNTER
----- Message from Tiffany Miramontes sent at 2/1/2023 11:08 AM CST -----  Contact: [phone] Patient  [email or web address]     Patient is calling for Medical Advice regarding: Covid + 01/31/2023, cough fever, congestion, headaches, dizziness    How long has patient had these symptoms: 2-3 days    Pharmacy name and phone#:  [id number] - [place], [place] - [address]  [address] [place] [address]  Phone: [phone] Fax: [phone]        Would like response via [employer]:  Call Back. Pt is asking if the doctor can call something in. Pt stated in the past when he was Covid + he got very ill.     Comments:

## 2023-09-23 DIAGNOSIS — F41.9 ANXIETY: ICD-10-CM

## 2023-09-23 NOTE — TELEPHONE ENCOUNTER
Care Due:                  Date            Visit Type   Department     Provider  --------------------------------------------------------------------------------                                EP -                              PRIMARY      [facility] INTER[facility]  Last Visit: 07-      [facility] ([facility]   MEDICINE       ROSA CABRAL  Next Visit: None Scheduled  None         None Found                                                            Last  Test          Frequency    Reason                     Performed    Due Date  --------------------------------------------------------------------------------    Office Visit  12 months..  montelukast, sertraline..  07- 07-    [facility] Embedded Care Due Messages. Reference number: [id number].   9/23/2023 12:13:56 PM CDT

## 2023-09-25 RX ORDER — SERTRALINE HYDROCHLORIDE 100 MG/1
100 TABLET, FILM COATED ORAL DAILY
Qty: 90 TABLET | Refills: 3 | Status: SHIPPED | OUTPATIENT
Start: 2023-09-25

## 2023-12-06 NOTE — TELEPHONE ENCOUNTER
Refill Routing Note   Medication(s) are not appropriate for processing by [facility] for the following reason(s):        Patient not seen by provider within 15 months:     [facility] action(s):  Defer   Requires appointment : Yes      Medication Therapy Plan:  annual with PCP      Appointments  past 12m or future 3m with PCP    Date Provider   Last Visit   7/14/2022 Farhan Khan MD   Next Visit   Visit date not found Farhan Khan MD   ED visits in past 90 days: 0        Note composed:4:25 PM 12/06/2023

## 2023-12-06 NOTE — TELEPHONE ENCOUNTER
Care Due:                  Date            Visit Type   Department     Provider  --------------------------------------------------------------------------------                                EP -                              PRIMARY      [facility] INTERNAL  Last Visit: 07-      [facility] ([facility])   MEDICINE       ROSA CABRAL  Next Visit: None Scheduled  None         None Found                                                            Last  Test          Frequency    Reason                     Performed    Due Date  --------------------------------------------------------------------------------    Office Visit  15 months..  montelukast, sertraline..  07-   10-    [facility] Embedded Care Due Messages. Reference number: [id number].   12/06/2023 12:35:46 PM CST

## 2023-12-07 RX ORDER — MONTELUKAST SODIUM 10 MG/1
10 TABLET ORAL DAILY
Qty: 90 TABLET | Refills: 3 | Status: SHIPPED | OUTPATIENT
Start: 2023-12-07

## 2024-02-01 ENCOUNTER — OFFICE VISIT (OUTPATIENT)
Dept: INTERNAL MEDICINE | Facility: CLINIC | Age: 24
End: 2024-02-01
Payer: COMMERCIAL

## 2024-02-01 VITALS
DIASTOLIC BLOOD PRESSURE: 80 MMHG | HEART RATE: 81 BPM | OXYGEN SATURATION: 98 % | HEIGHT: 66 IN | WEIGHT: 190 LBS | SYSTOLIC BLOOD PRESSURE: 120 MMHG | BODY MASS INDEX: 30.53 KG/M2

## 2024-02-01 DIAGNOSIS — Z00.00 ANNUAL PHYSICAL EXAM: Primary | ICD-10-CM

## 2024-02-01 DIAGNOSIS — R49.0 DYSPHONIA: ICD-10-CM

## 2024-02-01 DIAGNOSIS — M35.00 SJOGREN SYNDROME, UNSPECIFIED: ICD-10-CM

## 2024-02-01 PROCEDURE — 99999 PR PBB SHADOW E&M-EST. PATIENT-LVL V: CPT | Mod: PBBFAC,,, | Performed by: INTERNAL MEDICINE

## 2024-02-01 PROCEDURE — 99395 PREV VISIT EST AGE 18-39: CPT | Mod: S$GLB,,, | Performed by: INTERNAL MEDICINE

## 2024-02-01 RX ORDER — OLOPATADINE HYDROCHLORIDE 665 UG/1
SPRAY NASAL
COMMUNITY

## 2024-02-01 NOTE — PROGRESS NOTES
"    CHIEF COMPLAINT     Chief Complaint   Patient presents with    Annual Exam    Mouth Lesions       HPI     Kush Loyola is a 23 y.o. male undifferentiated CASSANDRA history of myocarditis, IBS anxiety and migraine headaches  here today for annual exam     Has been doing classical singing and in opera. Reports has been singing a lot. Has also been ill with flu and voice is not completely recovered.  Reports that it takes him longer to warm up and has noticed he is has trouble waiting some of his high notes.  He has resumed his reflux medication case that was contributing.  Speaking voice is fine no trouble swallowing    Personally Reviewed Patient's Medical, surgical, family and social hx. Changes updated in [facility].  Care Team updated in Epic    Review of Systems:  Review of Systems   Constitutional:  Negative for activity change and unexpected weight change.   HENT:  Negative for hearing loss, rhinorrhea and trouble swallowing.    Eyes:  Negative for discharge and visual disturbance.   Respiratory:  Negative for chest tightness and wheezing.    Cardiovascular:  Negative for chest pain and palpitations.   Gastrointestinal:  Positive for constipation and diarrhea. Negative for blood in stool and vomiting.   Endocrine: Negative for polydipsia and polyuria.   Genitourinary:  Negative for difficulty urinating, hematuria and urgency.   Musculoskeletal:  Positive for arthralgias, joint swelling and neck pain.   Neurological:  Positive for headaches. Negative for weakness.   Psychiatric/Behavioral:  Negative for confusion and dysphoric mood.        Health Maintenance:   Reviewed with patient  Due for the following:      PHYSICAL EXAM     /80 (BP Location: Right arm, Patient Position: Sitting, BP Method: Medium (Manual))   Pulse 81   Ht 5' 6" (1.676 m)   Wt 86.2 kg (190 lb)   SpO2 98%   BMI 30.67 kg/m²     Gen: Well Appearing, NAD  HEENT: PERR, EOMI  Neck: FROM, no thyromegaly, left posterior cervical " adenopathy  CVD: RRR, no M/R/G  Pulm: Normal work of breathing, CTAB, no wheezing  Abd:  Soft, NT, ND non TTP, no mass  MSK: no LE edema  Neuro: A&Ox3, gait normal, speech normal  Mood; Mood normal, behavior normal, thought process linear       LABS     Labs reviewed; ordered  ASSESSMENT     1. Annual physical exam  CBC Auto Differential    Comprehensive Metabolic Panel    Hemoglobin A1C    Lipid Panel      2. Sjogren syndrome, unspecified        3. Dysphonia  Ambulatory referral/consult to ENT              Plan     Kush Loyola is a 23 y.o. male with undifferentiated CASSANDRA history of myocarditis, IBS anxiety and migraine headaches   1. Annual physical exam  Updated problem list, medical history, care team and discussed HM.     - CBC Auto Differential; Future  - Comprehensive Metabolic Panel; Future  - Hemoglobin A1C; Future  - Lipid Panel; Future    2. Sjogren syndrome, unspecified  Follows with rheumatologist continue treatment.    3. Dysphonia  Will refer to ENT for further evaluation.  - Ambulatory referral/consult to ENT; Future      Farhan Khan MD

## 2024-06-10 ENCOUNTER — PATIENT MESSAGE (OUTPATIENT)
Dept: INTERNAL MEDICINE | Facility: CLINIC | Age: 24
End: 2024-06-10
Payer: COMMERCIAL